# Patient Record
Sex: FEMALE | Race: BLACK OR AFRICAN AMERICAN | Employment: UNEMPLOYED | URBAN - METROPOLITAN AREA
[De-identification: names, ages, dates, MRNs, and addresses within clinical notes are randomized per-mention and may not be internally consistent; named-entity substitution may affect disease eponyms.]

---

## 2020-01-06 ENCOUNTER — HOSPITAL ENCOUNTER (EMERGENCY)
Age: 25
Discharge: HOME | End: 2020-01-06

## 2020-01-06 ENCOUNTER — APPOINTMENT (OUTPATIENT)
Dept: GENERAL RADIOLOGY | Age: 25
End: 2020-01-06

## 2020-01-06 VITALS
BODY MASS INDEX: 33.75 KG/M2 | HEIGHT: 66 IN | SYSTOLIC BLOOD PRESSURE: 143 MMHG | WEIGHT: 210 LBS | HEART RATE: 92 BPM | OXYGEN SATURATION: 98 % | RESPIRATION RATE: 20 BRPM | TEMPERATURE: 98.7 F | DIASTOLIC BLOOD PRESSURE: 72 MMHG

## 2020-01-06 LAB
HCG, URINE, POC: NEGATIVE
INFLUENZA A BY PCR: NOT DETECTED
INFLUENZA B BY PCR: DETECTED
Lab: NORMAL
NEGATIVE QC PASS/FAIL: NORMAL
POSITIVE QC PASS/FAIL: NORMAL

## 2020-01-06 PROCEDURE — 99283 EMERGENCY DEPT VISIT LOW MDM: CPT

## 2020-01-06 PROCEDURE — 87502 INFLUENZA DNA AMP PROBE: CPT

## 2020-01-06 PROCEDURE — 71046 X-RAY EXAM CHEST 2 VIEWS: CPT

## 2020-01-06 PROCEDURE — 6370000000 HC RX 637 (ALT 250 FOR IP): Performed by: NURSE PRACTITIONER

## 2020-01-06 RX ORDER — ONDANSETRON 4 MG/1
4 TABLET, ORALLY DISINTEGRATING ORAL EVERY 8 HOURS PRN
Qty: 12 TABLET | Refills: 0 | Status: SHIPPED | OUTPATIENT
Start: 2020-01-06 | End: 2020-07-13

## 2020-01-06 RX ORDER — ONDANSETRON 4 MG/1
4 TABLET, ORALLY DISINTEGRATING ORAL ONCE
Status: COMPLETED | OUTPATIENT
Start: 2020-01-06 | End: 2020-01-06

## 2020-01-06 RX ORDER — GUAIFENESIN/DEXTROMETHORPHAN 100-10MG/5
5 SYRUP ORAL 3 TIMES DAILY PRN
Qty: 120 ML | Refills: 0 | Status: SHIPPED | OUTPATIENT
Start: 2020-01-06 | End: 2020-01-16

## 2020-01-06 RX ORDER — IBUPROFEN 800 MG/1
800 TABLET ORAL ONCE
Status: COMPLETED | OUTPATIENT
Start: 2020-01-06 | End: 2020-01-06

## 2020-01-06 RX ORDER — FLUTICASONE PROPIONATE 50 MCG
1 SPRAY, SUSPENSION (ML) NASAL DAILY
Qty: 1 BOTTLE | Refills: 0 | Status: SHIPPED | OUTPATIENT
Start: 2020-01-06 | End: 2021-02-19

## 2020-01-06 RX ORDER — IBUPROFEN 800 MG/1
800 TABLET ORAL EVERY 8 HOURS PRN
Qty: 30 TABLET | Refills: 0 | Status: SHIPPED | OUTPATIENT
Start: 2020-01-06 | End: 2021-02-19

## 2020-01-06 RX ADMIN — ONDANSETRON 4 MG: 4 TABLET, ORALLY DISINTEGRATING ORAL at 09:07

## 2020-01-06 RX ADMIN — IBUPROFEN 800 MG: 800 TABLET, FILM COATED ORAL at 09:08

## 2020-01-06 ASSESSMENT — PAIN SCALES - GENERAL: PAINLEVEL_OUTOF10: 8

## 2020-01-06 NOTE — ED PROVIDER NOTES
[facility]  HPI: Sarbjit Weems is a 30 y.o. female with a past medical history of  has a past medical history of Asthma, Brachial neuritis or radiculitis NOS, Headache(784.0), Migraine, and Migraine with aura, without mention of intractable migraine without mention of status migrainosus. presenting with complaints of a cough with nasal congestion. The patient states that these symptoms began gradually. The history is obtained from the patient. The patient states that he has had some subjective chills at home. Patient does complain of a mild cough associated with it that is productive with green sputum, as well as post tussive emesis. Patient denies excessive fatigue or sleeping greater than 18 hours a day. Patient denies exposure to mononucleosis. The patient denies any abdominal pain, left upper quadrant fullness, or early satiety. The patient also denies difficulty breathing, hemoptysis, neck pain/stiffness, or blurry vision. Sx have persisted and are mildly worse which is what prompted the visit today. + exposure to sick contacts.        Review of Systems:   Pertinent positives and negatives are stated within HPI, all other systems reviewed and are negative.          --------------------------------------------- PAST HISTORY ---------------------------------------------  Past Medical History:  has a past medical history of Asthma, Brachial neuritis or radiculitis NOS, Headache(784.0), Migraine, and Migraine with aura, without mention of intractable migraine without mention of status migrainosus. Past Surgical History:  has no past surgical history on file. Social History:  reports that she has quit smoking. Her smoking use included cigarettes. She smoked 0.50 packs per day. She does not have any smokeless tobacco history on file. She reports that she does not drink alcohol or use drugs.     Family History: family history includes Arthritis in her mother; Asthma in her mother; High Blood Pressure in her mother. The patients home medications have been reviewed. Allergies: Patient has no known allergies. ------------------------- NURSING NOTES AND VITALS REVIEWED ---------------------------   The nursing notes within the ED encounter and vital signs as below have been reviewed by myself. BP (!) 143/72   Pulse 92   Temp 98.7 °F (37.1 °C) (Oral)   Resp 20   Ht 5' 6\" (1.676 m)   Wt 210 lb (95.3 kg)   LMP 12/16/2019   SpO2 98%   BMI 33.89 kg/m²   Oxygen Saturation Interpretation: Normal    The patients available past medical records and past encounters were reviewed. Physical exam:  Constitutional: Vital signs are reviewed the patient is comfortable. The patient is alert and oriented and conversant. Head: The head is atraumatic and normocephalic. Eyes: No discharge is present from the eyes. The sclera are normal.  ENT: The oropharynx demonstrates a small amount of erythema bilaterally. There is no tonsillar enlargement nor is there any exudate present. No uvular deviation or edema. No tonsillary asymmetry.  Floor of the mouth soft, no trismus, handling secretions. TMs bilaterally demonstrate no evidence of infection. Bilateral nares mucosal edema with purulent drainage. Neck: Normal range of motion is achieved in the neck. There is no JVD present. No meningeal signs are present   Anterior cervical adenopathy is absent. Respiratory/chest: The chest is nontender. Breath sounds are normal. There is no respiratory distress.   Cardiovascular: Heart shows a regular rate and rhythm without murmurs clicks or gallops. Abdominal exam: The abdomen is non tender without evidence of peritoneal signs.  Specific attention to the left upper quadrant with palpation of the spleen demonstrates no organomegaly or tenderness  Skin: warm and dry, without rash  Neurologic: GCS 15   Psych: Normal Affect  -------------------------------------------------- RESULTS condition is good            Sly Figueredo, ROLAND - NILE  01/06/20 9439

## 2020-07-13 ENCOUNTER — APPOINTMENT (OUTPATIENT)
Dept: CT IMAGING | Age: 25
End: 2020-07-13
Payer: MEDICAID

## 2020-07-13 ENCOUNTER — HOSPITAL ENCOUNTER (EMERGENCY)
Age: 25
Discharge: HOME | End: 2020-07-13
Attending: EMERGENCY MEDICINE
Payer: MEDICAID

## 2020-07-13 VITALS
HEIGHT: 67 IN | SYSTOLIC BLOOD PRESSURE: 166 MMHG | WEIGHT: 228 LBS | BODY MASS INDEX: 35.79 KG/M2 | TEMPERATURE: 96.9 F | HEART RATE: 87 BPM | OXYGEN SATURATION: 98 % | RESPIRATION RATE: 14 BRPM | DIASTOLIC BLOOD PRESSURE: 95 MMHG

## 2020-07-13 LAB
ALBUMIN SERPL-MCNC: 4.3 G/DL (ref 3.5–5.2)
ALP BLD-CCNC: 54 U/L (ref 35–104)
ALT SERPL-CCNC: 14 U/L (ref 0–32)
ANION GAP SERPL CALCULATED.3IONS-SCNC: 9 MMOL/L (ref 7–16)
AST SERPL-CCNC: 14 U/L (ref 0–31)
BASOPHILS ABSOLUTE: 0.02 E9/L (ref 0–0.2)
BASOPHILS RELATIVE PERCENT: 0.3 % (ref 0–2)
BILIRUB SERPL-MCNC: 0.4 MG/DL (ref 0–1.2)
BILIRUBIN DIRECT: <0.2 MG/DL (ref 0–0.3)
BILIRUBIN URINE: NEGATIVE
BILIRUBIN, INDIRECT: NORMAL MG/DL (ref 0–1)
BLOOD, URINE: NEGATIVE
BUN BLDV-MCNC: 18 MG/DL (ref 6–20)
CALCIUM SERPL-MCNC: 9.3 MG/DL (ref 8.6–10.2)
CHLORIDE BLD-SCNC: 107 MMOL/L (ref 98–107)
CLARITY: CLEAR
CO2: 24 MMOL/L (ref 22–29)
COLOR: YELLOW
CREAT SERPL-MCNC: 0.8 MG/DL (ref 0.5–1)
EOSINOPHILS ABSOLUTE: 0.1 E9/L (ref 0.05–0.5)
EOSINOPHILS RELATIVE PERCENT: 1.4 % (ref 0–6)
GFR AFRICAN AMERICAN: >60
GFR NON-AFRICAN AMERICAN: >60 ML/MIN/1.73
GLUCOSE BLD-MCNC: 118 MG/DL (ref 74–99)
GLUCOSE URINE: NEGATIVE MG/DL
HCG, URINE, POC: NEGATIVE
HCT VFR BLD CALC: 38.9 % (ref 34–48)
HEMOGLOBIN: 12.2 G/DL (ref 11.5–15.5)
IMMATURE GRANULOCYTES #: 0.05 E9/L
IMMATURE GRANULOCYTES %: 0.7 % (ref 0–5)
KETONES, URINE: NEGATIVE MG/DL
LACTIC ACID: 0.8 MMOL/L (ref 0.5–2.2)
LEUKOCYTE ESTERASE, URINE: NEGATIVE
LIPASE: 25 U/L (ref 13–60)
LYMPHOCYTES ABSOLUTE: 1.82 E9/L (ref 1.5–4)
LYMPHOCYTES RELATIVE PERCENT: 25.7 % (ref 20–42)
Lab: NORMAL
MCH RBC QN AUTO: 28.6 PG (ref 26–35)
MCHC RBC AUTO-ENTMCNC: 31.4 % (ref 32–34.5)
MCV RBC AUTO: 91.1 FL (ref 80–99.9)
MONOCYTES ABSOLUTE: 0.45 E9/L (ref 0.1–0.95)
MONOCYTES RELATIVE PERCENT: 6.4 % (ref 2–12)
NEGATIVE QC PASS/FAIL: NORMAL
NEUTROPHILS ABSOLUTE: 4.63 E9/L (ref 1.8–7.3)
NEUTROPHILS RELATIVE PERCENT: 65.5 % (ref 43–80)
NITRITE, URINE: NEGATIVE
PDW BLD-RTO: 13.2 FL (ref 11.5–15)
PH UA: 7 (ref 5–9)
PLATELET # BLD: 175 E9/L (ref 130–450)
PMV BLD AUTO: 11.8 FL (ref 7–12)
POSITIVE QC PASS/FAIL: NORMAL
POTASSIUM REFLEX MAGNESIUM: 4.5 MMOL/L (ref 3.5–5)
PROTEIN UA: NEGATIVE MG/DL
RBC # BLD: 4.27 E12/L (ref 3.5–5.5)
SODIUM BLD-SCNC: 140 MMOL/L (ref 132–146)
SPECIFIC GRAVITY UA: 1.02 (ref 1–1.03)
TOTAL PROTEIN: 6.9 G/DL (ref 6.4–8.3)
UROBILINOGEN, URINE: 0.2 E.U./DL
WBC # BLD: 7.1 E9/L (ref 4.5–11.5)

## 2020-07-13 PROCEDURE — 6360000002 HC RX W HCPCS: Performed by: EMERGENCY MEDICINE

## 2020-07-13 PROCEDURE — 83690 ASSAY OF LIPASE: CPT

## 2020-07-13 PROCEDURE — 2580000003 HC RX 258: Performed by: RADIOLOGY

## 2020-07-13 PROCEDURE — 74177 CT ABD & PELVIS W/CONTRAST: CPT

## 2020-07-13 PROCEDURE — 2580000003 HC RX 258: Performed by: EMERGENCY MEDICINE

## 2020-07-13 PROCEDURE — 80048 BASIC METABOLIC PNL TOTAL CA: CPT

## 2020-07-13 PROCEDURE — 96374 THER/PROPH/DIAG INJ IV PUSH: CPT

## 2020-07-13 PROCEDURE — 81003 URINALYSIS AUTO W/O SCOPE: CPT

## 2020-07-13 PROCEDURE — 99284 EMERGENCY DEPT VISIT MOD MDM: CPT

## 2020-07-13 PROCEDURE — 6360000004 HC RX CONTRAST MEDICATION: Performed by: RADIOLOGY

## 2020-07-13 PROCEDURE — 85025 COMPLETE CBC W/AUTO DIFF WBC: CPT

## 2020-07-13 PROCEDURE — 83605 ASSAY OF LACTIC ACID: CPT

## 2020-07-13 PROCEDURE — 80076 HEPATIC FUNCTION PANEL: CPT

## 2020-07-13 RX ORDER — SODIUM CHLORIDE 0.9 % (FLUSH) 0.9 %
10 SYRINGE (ML) INJECTION ONCE
Status: COMPLETED | OUTPATIENT
Start: 2020-07-13 | End: 2020-07-13

## 2020-07-13 RX ORDER — 0.9 % SODIUM CHLORIDE 0.9 %
1000 INTRAVENOUS SOLUTION INTRAVENOUS ONCE
Status: COMPLETED | OUTPATIENT
Start: 2020-07-13 | End: 2020-07-13

## 2020-07-13 RX ORDER — ONDANSETRON 4 MG/1
4 TABLET, ORALLY DISINTEGRATING ORAL EVERY 8 HOURS PRN
Qty: 21 TABLET | Refills: 0 | Status: SHIPPED | OUTPATIENT
Start: 2020-07-13 | End: 2020-07-20

## 2020-07-13 RX ORDER — ONDANSETRON 2 MG/ML
4 INJECTION INTRAMUSCULAR; INTRAVENOUS ONCE
Status: COMPLETED | OUTPATIENT
Start: 2020-07-13 | End: 2020-07-13

## 2020-07-13 RX ADMIN — IOPAMIDOL 110 ML: 755 INJECTION, SOLUTION INTRAVENOUS at 09:48

## 2020-07-13 RX ADMIN — Medication 10 ML: at 09:48

## 2020-07-13 RX ADMIN — SODIUM CHLORIDE 1000 ML: 9 INJECTION, SOLUTION INTRAVENOUS at 08:47

## 2020-07-13 RX ADMIN — ONDANSETRON 4 MG: 2 INJECTION INTRAMUSCULAR; INTRAVENOUS at 12:20

## 2020-07-13 ASSESSMENT — PAIN SCALES - GENERAL: PAINLEVEL_OUTOF10: 7

## 2020-07-13 ASSESSMENT — PAIN DESCRIPTION - LOCATION: LOCATION: ABDOMEN

## 2020-07-14 ASSESSMENT — ENCOUNTER SYMPTOMS
NAUSEA: 1
EYE REDNESS: 0
CONSTIPATION: 0
DIARRHEA: 1
BACK PAIN: 0
EYE PAIN: 0
COUGH: 0
RHINORRHEA: 0
SHORTNESS OF BREATH: 0
SORE THROAT: 0
VOMITING: 1
ABDOMINAL PAIN: 1
BLOOD IN STOOL: 0
SINUS PRESSURE: 0
ABDOMINAL DISTENTION: 0
WHEEZING: 0
EYE DISCHARGE: 0

## 2020-07-14 NOTE — ED PROVIDER NOTES
HPI  This is a 31 yo F with a PMHx significant for migraines and childhood asthma who presents with six days of generalized abdominal pain, nausea, vomiting and diarrhea. The patient states that her symptoms began abruptly and have been ongoing since. She describes the pain as an overall intermittent cramping sensation. She rates the pain a s a 7/10 and states that it does not radiate anywhere. She states she has had many episodes of NBNB emesis and diarrhea at every BM since onset of symptomsl. The patient denies recent trauma, fever, chills, fatigue, HA, dizziness, vision changes, congestion, rhinorrhea, sore throat, neck pain, chest pain, palpitations, hx of MI, LE edema, SOB, cough, wheezing, constipation, hematemesis, hematochezia, melena, dysuria, hematuria, generalized weakness and paresthesias. The patient is currently taking no blood thinners. The patient is a former smoker who denies all alcohol and illicit drug use. The history is provided by the patient. Review of Systems   Constitutional: Positive for appetite change (decreased oral intake due to symptoms). Negative for chills and fever. HENT: Negative for congestion, ear pain, postnasal drip, rhinorrhea, sinus pressure and sore throat. Eyes: Negative for pain, discharge and redness. Respiratory: Negative for cough, shortness of breath and wheezing. Cardiovascular: Negative for chest pain, palpitations and leg swelling. Gastrointestinal: Positive for abdominal pain, diarrhea, nausea and vomiting. Negative for abdominal distention, blood in stool and constipation. Genitourinary: Negative for difficulty urinating, dysuria, flank pain, frequency and hematuria. Musculoskeletal: Negative for arthralgias, back pain, myalgias and neck pain. Skin: Negative for rash and wound. Neurological: Negative for dizziness, seizures, weakness, light-headedness, numbness and headaches. Hematological: Negative for adenopathy.    All other systems reviewed and are negative. Physical Exam  Vitals signs and nursing note reviewed. Constitutional:       General: She is awake. She is in acute distress (2/2 to abdominal pain and nausea). Appearance: She is not diaphoretic. HENT:      Head: Normocephalic and atraumatic. Right Ear: External ear normal.      Left Ear: External ear normal.      Nose: Nose normal. No congestion or rhinorrhea. Mouth/Throat:      Mouth: Mucous membranes are dry. Pharynx: Oropharynx is clear. No oropharyngeal exudate or posterior oropharyngeal erythema. Eyes:      General: No scleral icterus. Right eye: No discharge. Left eye: No discharge. Extraocular Movements: Extraocular movements intact. Conjunctiva/sclera: Conjunctivae normal.      Pupils: Pupils are equal, round, and reactive to light. Neck:      Musculoskeletal: Normal range of motion and neck supple. No neck rigidity or muscular tenderness. Cardiovascular:      Rate and Rhythm: Normal rate and regular rhythm. Heart sounds: Normal heart sounds. No murmur. No friction rub. No gallop. Comments: Upper extremity and lower extremity distal pulses intact bilaterally +2/4  Pulmonary:      Effort: Pulmonary effort is normal. No respiratory distress. Breath sounds: Normal breath sounds. No wheezing, rhonchi or rales. Chest:      Chest wall: No tenderness. Abdominal:      General: Bowel sounds are normal. There is no distension. Palpations: Abdomen is soft. There is no mass. Tenderness: There is abdominal tenderness (generalized to mild palpation). There is no right CVA tenderness, left CVA tenderness, guarding or rebound. Hernia: No hernia is present. Musculoskeletal: Normal range of motion. General: No tenderness or deformity. Right lower leg: No edema. Left lower leg: No edema. Lymphadenopathy:      Cervical: No cervical adenopathy.    Skin:     General: Skin is warm and dry. Capillary Refill: Capillary refill takes less than 2 seconds. Findings: No erythema or rash. Neurological:      General: No focal deficit present. Mental Status: She is alert and oriented to person, place, and time. Sensory: No sensory deficit. Motor: No weakness. Coordination: Coordination normal.        Procedures:  No procedures performed. ---------------------------------- PAST HISTORY ---------------------------------------------  Past Medical History:  has a past medical history of Asthma, Brachial neuritis or radiculitis NOS, Headache(784.0), Migraine, and Migraine with aura, without mention of intractable migraine without mention of status migrainosus. Past Surgical History:  has no past surgical history on file. Social History:  reports that she has quit smoking. Her smoking use included cigarettes. She smoked 0.50 packs per day. She does not have any smokeless tobacco history on file. She reports that she does not drink alcohol or use drugs. Family History: family history includes Arthritis in her mother; Asthma in her mother; High Blood Pressure in her mother. The patients home medications have been reviewed. Allergies: Patient has no known allergies.     -------------------------------------------------- RESULTS -------------------------------------------------  Labs:  Results for orders placed or performed during the hospital encounter of 07/13/20   CBC Auto Differential   Result Value Ref Range    WBC 7.1 4.5 - 11.5 E9/L    RBC 4.27 3.50 - 5.50 E12/L    Hemoglobin 12.2 11.5 - 15.5 g/dL    Hematocrit 38.9 34.0 - 48.0 %    MCV 91.1 80.0 - 99.9 fL    MCH 28.6 26.0 - 35.0 pg    MCHC 31.4 (L) 32.0 - 34.5 %    RDW 13.2 11.5 - 15.0 fL    Platelets 027 297 - 656 E9/L    MPV 11.8 7.0 - 12.0 fL    Neutrophils % 65.5 43.0 - 80.0 %    Immature Granulocytes % 0.7 0.0 - 5.0 %    Lymphocytes % 25.7 20.0 - 42.0 %    Monocytes % 6.4 2.0 - 12.0 % Eosinophils % 1.4 0.0 - 6.0 %    Basophils % 0.3 0.0 - 2.0 %    Neutrophils Absolute 4.63 1.80 - 7.30 E9/L    Immature Granulocytes # 0.05 E9/L    Lymphocytes Absolute 1.82 1.50 - 4.00 E9/L    Monocytes Absolute 0.45 0.10 - 0.95 E9/L    Eosinophils Absolute 0.10 0.05 - 0.50 E9/L    Basophils Absolute 0.02 0.00 - 0.20 A3/P   Basic Metabolic Panel w/ Reflex to MG   Result Value Ref Range    Sodium 140 132 - 146 mmol/L    Potassium reflex Magnesium 4.5 3.5 - 5.0 mmol/L    Chloride 107 98 - 107 mmol/L    CO2 24 22 - 29 mmol/L    Anion Gap 9 7 - 16 mmol/L    Glucose 118 (H) 74 - 99 mg/dL    BUN 18 6 - 20 mg/dL    CREATININE 0.8 0.5 - 1.0 mg/dL    GFR Non-African American >60 >=60 mL/min/1.73    GFR African American >60     Calcium 9.3 8.6 - 10.2 mg/dL   Hepatic Function Panel   Result Value Ref Range    Total Protein 6.9 6.4 - 8.3 g/dL    Alb 4.3 3.5 - 5.2 g/dL    Alkaline Phosphatase 54 35 - 104 U/L    ALT 14 0 - 32 U/L    AST 14 0 - 31 U/L    Total Bilirubin 0.4 0.0 - 1.2 mg/dL    Bilirubin, Direct <0.2 0.0 - 0.3 mg/dL    Bilirubin, Indirect see below 0.0 - 1.0 mg/dL   Lipase   Result Value Ref Range    Lipase 25 13 - 60 U/L   Urinalysis, reflex to microscopic   Result Value Ref Range    Color, UA Yellow Straw/Yellow    Clarity, UA Clear Clear    Glucose, Ur Negative Negative mg/dL    Bilirubin Urine Negative Negative    Ketones, Urine Negative Negative mg/dL    Specific Gravity, UA 1.020 1.005 - 1.030    Blood, Urine Negative Negative    pH, UA 7.0 5.0 - 9.0    Protein, UA Negative Negative mg/dL    Urobilinogen, Urine 0.2 <2.0 E.U./dL    Nitrite, Urine Negative Negative    Leukocyte Esterase, Urine Negative Negative   Lactic Acid, Plasma   Result Value Ref Range    Lactic Acid 0.8 0.5 - 2.2 mmol/L   POC Pregnancy Urine Qual   Result Value Ref Range    HCG, Urine, POC Negative Negative    Lot Number [id number]     Positive QC Pass/Fail Pass     Negative QC Pass/Fail Pass          Radiology:  CT ABDOMEN PELVIS W IV primary care physician in 2-3 days. The patient verbalized her understanding and agreement with the plan as presented and stated her intention to follow up with her PCP by calling to schedule an appointment as soon as possible. Reasons to return to the ER or seek immediate evaluation by a medical provider were discussed at length and all questions were answered. The patient was discharged home in stable condition. MDM:  Patient presented from home with several days of abrupt onset generalized abdominal tenderness and n/v/d. Exam as above. Urine preg neg. Labs wnl. Patient given zofran and IVF with symptomatic improvement. CT abd/pel was unremarkable. Patient discharged with prescription for zofran and recommendation for symptomatic care and PCP follow up. Stable at discharge. Discharge Medication List as of 7/13/2020 12:49 PM          Diagnosis:  1. Non-intractable vomiting with nausea, unspecified vomiting type    2. Diarrhea, unspecified type    3. Generalized abdominal pain        Disposition:  Patient's disposition: Discharge to home  Patient's condition is stable.        Lea Jacobo,   Resident  07/14/20 1095

## 2020-09-09 ENCOUNTER — HOSPITAL ENCOUNTER (EMERGENCY)
Age: 25
Discharge: LEFT BEFORE DISCHARGE | End: 2020-09-09
Attending: EMERGENCY MEDICINE
Payer: MEDICAID

## 2020-09-09 VITALS
SYSTOLIC BLOOD PRESSURE: 134 MMHG | HEIGHT: 66 IN | HEART RATE: 80 BPM | RESPIRATION RATE: 16 BRPM | WEIGHT: 228 LBS | DIASTOLIC BLOOD PRESSURE: 77 MMHG | OXYGEN SATURATION: 98 % | BODY MASS INDEX: 36.64 KG/M2 | TEMPERATURE: 97.6 F

## 2020-09-09 LAB
ALBUMIN SERPL-MCNC: 4.3 G/DL (ref 3.5–5.2)
ALP BLD-CCNC: 57 U/L (ref 35–104)
ALT SERPL-CCNC: 13 U/L (ref 0–32)
ANION GAP SERPL CALCULATED.3IONS-SCNC: 11 MMOL/L (ref 7–16)
AST SERPL-CCNC: 13 U/L (ref 0–31)
BACTERIA: ABNORMAL /HPF
BASOPHILS ABSOLUTE: 0.02 E9/L (ref 0–0.2)
BASOPHILS RELATIVE PERCENT: 0.2 % (ref 0–2)
BILIRUB SERPL-MCNC: 0.6 MG/DL (ref 0–1.2)
BILIRUBIN URINE: NEGATIVE
BLOOD, URINE: NEGATIVE
BUN BLDV-MCNC: 11 MG/DL (ref 6–20)
CALCIUM SERPL-MCNC: 9.6 MG/DL (ref 8.6–10.2)
CHLORIDE BLD-SCNC: 103 MMOL/L (ref 98–107)
CLARITY: ABNORMAL
CO2: 25 MMOL/L (ref 22–29)
COLOR: YELLOW
CREAT SERPL-MCNC: 0.7 MG/DL (ref 0.5–1)
EOSINOPHILS ABSOLUTE: 0.08 E9/L (ref 0.05–0.5)
EOSINOPHILS RELATIVE PERCENT: 0.8 % (ref 0–6)
EPITHELIAL CELLS, UA: ABNORMAL /HPF
GFR AFRICAN AMERICAN: >60
GFR NON-AFRICAN AMERICAN: >60 ML/MIN/1.73
GLUCOSE BLD-MCNC: 85 MG/DL (ref 74–99)
GLUCOSE URINE: NEGATIVE MG/DL
HCG, URINE, POC: NEGATIVE
HCT VFR BLD CALC: 38 % (ref 34–48)
HEMOGLOBIN: 11.7 G/DL (ref 11.5–15.5)
IMMATURE GRANULOCYTES #: 0.04 E9/L
IMMATURE GRANULOCYTES %: 0.4 % (ref 0–5)
KETONES, URINE: NEGATIVE MG/DL
LACTIC ACID, SEPSIS: 0.6 MMOL/L (ref 0.5–1.9)
LEUKOCYTE ESTERASE, URINE: NEGATIVE
LYMPHOCYTES ABSOLUTE: 2.59 E9/L (ref 1.5–4)
LYMPHOCYTES RELATIVE PERCENT: 27.3 % (ref 20–42)
Lab: NORMAL
MCH RBC QN AUTO: 28.1 PG (ref 26–35)
MCHC RBC AUTO-ENTMCNC: 30.8 % (ref 32–34.5)
MCV RBC AUTO: 91.3 FL (ref 80–99.9)
MONOCYTES ABSOLUTE: 0.63 E9/L (ref 0.1–0.95)
MONOCYTES RELATIVE PERCENT: 6.7 % (ref 2–12)
NEGATIVE QC PASS/FAIL: NORMAL
NEUTROPHILS ABSOLUTE: 6.11 E9/L (ref 1.8–7.3)
NEUTROPHILS RELATIVE PERCENT: 64.6 % (ref 43–80)
NITRITE, URINE: NEGATIVE
PDW BLD-RTO: 13.3 FL (ref 11.5–15)
PH UA: 7 (ref 5–9)
PLATELET # BLD: 182 E9/L (ref 130–450)
PMV BLD AUTO: 11.3 FL (ref 7–12)
POSITIVE QC PASS/FAIL: NORMAL
POTASSIUM SERPL-SCNC: 4.1 MMOL/L (ref 3.5–5)
PROTEIN UA: NEGATIVE MG/DL
RBC # BLD: 4.16 E12/L (ref 3.5–5.5)
RBC UA: ABNORMAL /HPF (ref 0–2)
SODIUM BLD-SCNC: 139 MMOL/L (ref 132–146)
SPECIFIC GRAVITY UA: 1.02 (ref 1–1.03)
TOTAL PROTEIN: 7.3 G/DL (ref 6.4–8.3)
UROBILINOGEN, URINE: 0.2 E.U./DL
WBC # BLD: 9.5 E9/L (ref 4.5–11.5)
WBC UA: ABNORMAL /HPF (ref 0–5)

## 2020-09-09 PROCEDURE — 99282 EMERGENCY DEPT VISIT SF MDM: CPT

## 2020-09-09 PROCEDURE — 81001 URINALYSIS AUTO W/SCOPE: CPT

## 2020-09-09 PROCEDURE — 99283 EMERGENCY DEPT VISIT LOW MDM: CPT

## 2020-09-09 PROCEDURE — 85025 COMPLETE CBC W/AUTO DIFF WBC: CPT

## 2020-09-09 PROCEDURE — 83605 ASSAY OF LACTIC ACID: CPT

## 2020-09-09 PROCEDURE — 80053 COMPREHEN METABOLIC PANEL: CPT

## 2020-09-09 NOTE — ED PROVIDER NOTES
HPI:  9/9/20, Time: 6:00 PM EDT         Deepak Dey is a 30 y.o. female presenting to the ED for right breast pain beginning a few days ago. Patient denies any trauma to her breast.  She was seen in urgent care yesterday, and she was started on Keflex 500 mg twice daily. She states she has been taking it without relief. She states that her symptoms have been worsening. She states that she previously had a nipple ring in place which she removed. She states that pus drained from both holes from the nipple ring initially, but that is now resolved. She states that she feels a lump in her breast which is worse with palpation. No nipple discharge. She denies similar episodes in the past.  Patient is not breast-feeding. No fevers, nausea, vomiting, diarrhea, chest pain, shortness of breath, or abdominal pain. Review of Systems:   Pertinent positives and negatives are stated within HPI, all other systems reviewed and are negative.          --------------------------------------------- PAST HISTORY ---------------------------------------------  Past Medical History:  has a past medical history of Asthma, Brachial neuritis or radiculitis NOS, Headache(784.0), Migraine, and Migraine with aura, without mention of intractable migraine without mention of status migrainosus. Past Surgical History:  has no past surgical history on file. Social History:  reports that she has quit smoking. Her smoking use included cigarettes. She smoked 0.50 packs per day. She has never used smokeless tobacco. She reports that she does not drink alcohol or use drugs. Family History: family history includes Arthritis in her mother; Asthma in her mother; High Blood Pressure in her mother. The patients home medications have been reviewed. Allergies: Patient has no known allergies.     -------------------------------------------------- RESULTS -------------------------------------------------  All laboratory and radiology <2.0 E.U./dL    Nitrite, Urine Negative Negative    Leukocyte Esterase, Urine Negative Negative    WBC, UA NONE 0 - 5 /HPF    RBC, UA NONE 0 - 2 /HPF    Epithelial Cells, UA MANY /HPF    Bacteria, UA FEW (A) None Seen /HPF   Lactate, Sepsis   Result Value Ref Range    Lactic Acid, Sepsis 0.6 0.5 - 1.9 mmol/L   POC Pregnancy Urine Qual   Result Value Ref Range    HCG, Urine, POC Negative Negative    Lot Number [id number]     Positive QC Pass/Fail Acceptable     Negative QC Pass/Fail Acceptable        RADIOLOGY:  Interpreted by Radiologist.  Aminta Overton NONVASCULAR TRUNK SCAN    (Results Pending)       ------------------------- NURSING NOTES AND VITALS REVIEWED ---------------------------   The nursing notes within the ED encounter and vital signs as below have been reviewed. /77   Pulse 80   Temp 97.6 °F (36.4 °C)   Resp 16   Ht 5' 6\" (1.676 m)   Wt 228 lb (103.4 kg)   SpO2 98%   BMI 36.80 kg/m²   Oxygen Saturation Interpretation: Normal      ---------------------------------------------------PHYSICAL EXAM--------------------------------------      Constitutional/General: Alert and oriented x3, well appearing, non toxic in NAD  Head: Normocephalic and atraumatic  Eyes: PERRL, EOMI  Mouth: Oropharynx clear, handling secretions, no trismus  Neck: Supple, full ROM,   Pulmonary: Lungs clear to auscultation bilaterally, no wheezes, rales, or rhonchi. Not in respiratory distress  Cardiovascular:  Regular rate and rhythm, no murmurs, gallops, or rubs. 2+ distal pulses  Abdomen: Soft, non tender, non distended,   Breast: Right breast-area of induration posterior and superior to the areola with mild overlying erythema, no active drainage, no nipple drainage  Extremities: Moves all extremities x 4.  Warm and well perfused  Skin: warm and dry without rash  Neurologic: GCS 15, no focal motor or sensory deficits   Psych: Normal Affect      ------------------------------ ED COURSE/MEDICAL DECISION MAKING----------------------  Medications - No data to display    Medical Decision Making/ED COURSE:   Patient is a 49-year-old female presenting with breast lump and pain. In the ED, patient was hemodynamically stable and afebrile. On exam, had a palpable lump to her right breast with mild overlying erythema and no active discharge. Labs and breast ultrasound obtained. I reviewed and interpreted labs. No acute findings. Patient eloped from the ED prior to completion of breast ultrasound. Patient remained hemodynamically stable throughout ED course. ED Course as of Sep 10 0302   Wed Sep 09, 2020   8171 Patient reportedly eloped. [JA]      ED Course User Index  [JA] Harika Mckenzie MD       Counseling: The emergency provider has spoken with the patient and discussed todays results, in addition to providing specific details for the plan of care and counseling regarding the diagnosis and prognosis. Questions are answered at this time and they are agreeable with the plan.      --------------------------------- IMPRESSION AND DISPOSITION ---------------------------------    IMPRESSION  1. Breast mass        DISPOSITION  Disposition: eloped  Patient condition is stable      NOTE: This report was transcribed using voice recognition software.  Every effort was made to ensure accuracy; however, inadvertent computerized transcription errors may be present    IHarika MD, am the primary provider of this record       Harika Mckenzie MD  09/10/20 4771

## 2020-09-10 ENCOUNTER — APPOINTMENT (OUTPATIENT)
Dept: ULTRASOUND IMAGING | Age: 25
End: 2020-09-10
Payer: MEDICAID

## 2020-09-10 ENCOUNTER — HOSPITAL ENCOUNTER (EMERGENCY)
Age: 25
Discharge: HOME | End: 2020-09-10
Payer: MEDICAID

## 2020-09-10 VITALS
HEART RATE: 71 BPM | BODY MASS INDEX: 35.79 KG/M2 | TEMPERATURE: 97.5 F | DIASTOLIC BLOOD PRESSURE: 109 MMHG | SYSTOLIC BLOOD PRESSURE: 177 MMHG | WEIGHT: 228 LBS | OXYGEN SATURATION: 97 % | HEIGHT: 67 IN | RESPIRATION RATE: 16 BRPM

## 2020-09-10 PROCEDURE — 99282 EMERGENCY DEPT VISIT SF MDM: CPT

## 2020-09-10 PROCEDURE — 76999 ECHO EXAMINATION PROCEDURE: CPT

## 2020-09-10 PROCEDURE — 99283 EMERGENCY DEPT VISIT LOW MDM: CPT

## 2020-09-10 RX ORDER — SULFAMETHOXAZOLE AND TRIMETHOPRIM 800; 160 MG/1; MG/1
1 TABLET ORAL 2 TIMES DAILY
Qty: 20 TABLET | Refills: 0 | Status: SHIPPED | OUTPATIENT
Start: 2020-09-10 | End: 2020-09-20

## 2020-09-10 RX ORDER — HYDROCODONE BITARTRATE AND ACETAMINOPHEN 5; 325 MG/1; MG/1
1 TABLET ORAL EVERY 6 HOURS PRN
Qty: 12 TABLET | Refills: 0 | Status: SHIPPED | OUTPATIENT
Start: 2020-09-10 | End: 2020-09-13

## 2020-09-10 ASSESSMENT — PAIN SCALES - GENERAL: PAINLEVEL_OUTOF10: 9

## 2020-09-10 NOTE — ED PROVIDER NOTES
[facility]       Department of Emergency Medicine   ED  Provider Note  Admit Date/RoomTime: 9/10/2020  9:07 AM  ED Room: ANITA/ANITA  MRN: [id number]  Chief Complaint: Breast Pain (pain in right breast which started last month and worsened last week )       History of Present Illness   Source of history provided by:  patient. History/Exam Limitations: none. Fernando Clemons is a 30 y.o. female who has a past medical history of:   Past Medical History:   Diagnosis Date    Asthma     Brachial neuritis or radiculitis NOS     Headache(784.0)     Migraine     Migraine with aura, without mention of intractable migraine without mention of status migrainosus     presents to the ED by private car and is alone for right breast pain. Patient states that has been uncomfortable for the past month. She states her last several days she has had increasing pain to the breast.  She states that 2 days ago she was seen at urgent care. She states that she was started on Keflex as there was some overlying erythema to the area. She states that she has had her nipples pierced for 5 years, states that she took the nipple rings out about a week ago. She states that there is some yellow drainage from the area. She states that her nipple drained yellow discharge yesterday as well. She was seen and evaluated in ER yesterday. She states that she was unable to wait for her ultrasound and that so she presents today. She denies any fevers or chills. Nothing make her symptoms better or worse. ROS    Pertinent positives and negatives are stated within HPI, all other systems reviewed and are negative. No past surgical history on file. Social History:  reports that she has quit smoking. Her smoking use included cigarettes. She smoked 0.50 packs per day. She has never used smokeless tobacco. She reports that she does not drink alcohol or use drugs.   Family History: family history includes Arthritis in her mother; Asthma in her abscess versus neoplasm             ED Course / Medical Decision Making   Medications - No data to display     Re-examination:  9/10/20       Time: 4609   Updated on results. Consult(s):   None    Procedure(s):   none    MDM:   Patient had labs done yesterday, these were reviewed. White blood cell count within normal range, patient was not pregnant. Ultrasound shows mass versus abscess. Nothing superficial to I&D at this time, there is minimal overlying erythema. No fevers at home, vital signs are stable. Patient appears well. Will discharge home at this time. Will add Bactrim as she is already taking Keflex. Patient is advised that she will need to follow-up with a breast surgeon and is again given this information with Dr. Julian Piedra. Advised return the ER for any worsening symptoms. Otherwise to follow-up as directed    Counseling: The emergency provider has spoken with the patient and discussed todays results, in addition to providing specific details for the plan of care and counseling regarding the diagnosis and prognosis. Questions are answered at this time and they are agreeable with the plan. Assessment      1. Breast mass in female      Plan   Discharge to home  Patient condition is good    New Medications     Discharge Medication List as of 9/10/2020  1:08 PM      START taking these medications    Details   sulfamethoxazole-trimethoprim (BACTRIM DS) 800-160 MG per tablet Take 1 tablet by mouth 2 times daily for 10 days, Disp-20 tablet,R-0Print      HYDROcodone-acetaminophen (NORCO) 5-325 MG per tablet Take 1 tablet by mouth every 6 hours as needed for Pain for up to 3 days. , Disp-12 tablet,R-0Print           Electronically signed by GARETH Avelar   DD: 9/10/20  **This report was transcribed using voice recognition software. Every effort was made to ensure accuracy; however, inadvertent computerized transcription errors may be present.   END OF ED PROVIDER NOTE       Bry Kemp Shaneka Sargent, [place]  09/10/20 1408

## 2020-09-20 ENCOUNTER — HOSPITAL ENCOUNTER (EMERGENCY)
Age: 25
Discharge: HOME | End: 2020-09-20
Payer: MEDICAID

## 2020-09-20 VITALS
HEIGHT: 66 IN | OXYGEN SATURATION: 99 % | RESPIRATION RATE: 19 BRPM | BODY MASS INDEX: 35.36 KG/M2 | WEIGHT: 220 LBS | TEMPERATURE: 97.6 F | SYSTOLIC BLOOD PRESSURE: 125 MMHG | DIASTOLIC BLOOD PRESSURE: 80 MMHG | HEART RATE: 80 BPM

## 2020-09-20 PROCEDURE — 6370000000 HC RX 637 (ALT 250 FOR IP): Performed by: PHYSICIAN ASSISTANT

## 2020-09-20 PROCEDURE — 99284 EMERGENCY DEPT VISIT MOD MDM: CPT

## 2020-09-20 PROCEDURE — 99283 EMERGENCY DEPT VISIT LOW MDM: CPT

## 2020-09-20 RX ORDER — OXYCODONE HYDROCHLORIDE AND ACETAMINOPHEN 5; 325 MG/1; MG/1
1 TABLET ORAL EVERY 6 HOURS PRN
Qty: 10 TABLET | Refills: 0 | Status: SHIPPED | OUTPATIENT
Start: 2020-09-20 | End: 2020-09-25

## 2020-09-20 RX ORDER — CLINDAMYCIN HYDROCHLORIDE 150 MG/1
300 CAPSULE ORAL 3 TIMES DAILY
Qty: 60 CAPSULE | Refills: 0 | Status: SHIPPED | OUTPATIENT
Start: 2020-09-20 | End: 2020-09-30

## 2020-09-20 RX ORDER — OXYCODONE HYDROCHLORIDE AND ACETAMINOPHEN 5; 325 MG/1; MG/1
1 TABLET ORAL ONCE
Status: COMPLETED | OUTPATIENT
Start: 2020-09-20 | End: 2020-09-20

## 2020-09-20 RX ORDER — CLINDAMYCIN HYDROCHLORIDE 150 MG/1
300 CAPSULE ORAL ONCE
Status: COMPLETED | OUTPATIENT
Start: 2020-09-20 | End: 2020-09-20

## 2020-09-20 RX ADMIN — CLINDAMYCIN HYDROCHLORIDE 300 MG: 150 CAPSULE ORAL at 23:45

## 2020-09-20 RX ADMIN — OXYCODONE AND ACETAMINOPHEN 1 TABLET: 5; 325 TABLET ORAL at 23:12

## 2020-09-20 ASSESSMENT — PAIN SCALES - GENERAL
PAINLEVEL_OUTOF10: 10
PAINLEVEL_OUTOF10: 10

## 2020-09-21 NOTE — ED NOTES
Bed: 21  Expected date:   Expected time:   Means of arrival:   Comments:  triage     Ghassan Wang RN  09/20/20 6968

## 2020-09-24 ENCOUNTER — HOSPITAL ENCOUNTER (OUTPATIENT)
Age: 25
Discharge: HOME | End: 2020-09-26
Payer: MEDICAID

## 2020-09-24 PROCEDURE — 87186 SC STD MICRODIL/AGAR DIL: CPT

## 2020-09-24 PROCEDURE — 87205 SMEAR GRAM STAIN: CPT

## 2020-09-24 PROCEDURE — 87070 CULTURE OTHR SPECIMN AEROBIC: CPT

## 2020-09-24 PROCEDURE — 87077 CULTURE AEROBIC IDENTIFY: CPT

## 2020-09-25 ENCOUNTER — HOSPITAL ENCOUNTER (EMERGENCY)
Age: 25
Discharge: LEFT BEFORE DISCHARGE | End: 2020-09-25
Payer: MEDICAID

## 2020-09-25 VITALS
BODY MASS INDEX: 35.79 KG/M2 | OXYGEN SATURATION: 97 % | WEIGHT: 228 LBS | DIASTOLIC BLOOD PRESSURE: 85 MMHG | SYSTOLIC BLOOD PRESSURE: 164 MMHG | TEMPERATURE: 97.2 F | HEIGHT: 67 IN | HEART RATE: 90 BPM | RESPIRATION RATE: 18 BRPM

## 2020-09-25 LAB — GRAM STAIN ORDERABLE: NORMAL

## 2020-09-25 PROCEDURE — 6370000000 HC RX 637 (ALT 250 FOR IP): Performed by: NURSE PRACTITIONER

## 2020-09-25 PROCEDURE — 99283 EMERGENCY DEPT VISIT LOW MDM: CPT

## 2020-09-25 RX ORDER — ONDANSETRON 4 MG/1
4 TABLET, ORALLY DISINTEGRATING ORAL ONCE
Status: COMPLETED | OUTPATIENT
Start: 2020-09-25 | End: 2020-09-25

## 2020-09-25 RX ORDER — IBUPROFEN 800 MG/1
800 TABLET ORAL ONCE
Status: COMPLETED | OUTPATIENT
Start: 2020-09-25 | End: 2020-09-25

## 2020-09-25 RX ADMIN — IBUPROFEN 800 MG: 800 TABLET ORAL at 10:28

## 2020-09-25 RX ADMIN — ONDANSETRON 4 MG: 4 TABLET, ORALLY DISINTEGRATING ORAL at 10:28

## 2020-09-25 ASSESSMENT — PAIN SCALES - GENERAL
PAINLEVEL_OUTOF10: 6
PAINLEVEL_OUTOF10: 8

## 2020-09-25 ASSESSMENT — PAIN DESCRIPTION - LOCATION: LOCATION: BREAST

## 2020-09-25 ASSESSMENT — PAIN DESCRIPTION - DESCRIPTORS: DESCRIPTORS: BURNING

## 2020-09-25 ASSESSMENT — PAIN DESCRIPTION - ORIENTATION: ORIENTATION: RIGHT

## 2020-09-25 ASSESSMENT — PAIN DESCRIPTION - FREQUENCY: FREQUENCY: INTERMITTENT

## 2020-09-25 NOTE — ED NOTES
This rn went to preform wound care. Pt not in room. Bathrooms of ready care checked. Lobby checked.  Pt could not be found         Asyassa Curling, RN  09/25/20 9395

## 2020-09-25 NOTE — ED NOTES
Pt did not come back to room bathrooms checked again and lobby checked. This pt could not be found.  np made aware       Tyron Vizcarra RN  09/25/20 1637

## 2020-09-25 NOTE — CARE COORDINATION
Social Work [address] [place] [address] Planning:    Pt presents to the ED secondary to breast pain. Pt was at Dr Jared Morales office yesterday and had an abscess drained. Pt states she is unable to change dressing due to pain/nausea but has a friend Yudelka Escalante, who is able to learn if needed. SW spoke to staff from Dr Jared Morales office and inquired if [place] [address] was set up and/or what dressing change orders should be. SW to wait for return call. 1130am- Per Enma Esparza at Dr Jared Morales office, pt will need daily dressing changes and he is willing to follow if [place] [address] order is written for pt. Pt does not have a preference of [place] [address] agency. Several referrals made to [place] [address] agencies and pt has been denied. Per NILE Jones, pt has eloped from ED.

## 2020-09-25 NOTE — ED PROVIDER NOTES
[facility]     Department of Emergency Medicine   ED  Provider Note  Admit Date/RoomTime: 9/25/2020 10:09 AM  ED Room: 31/31   Chief Complaint   Breast Pain (had abscess rt breast drained yesterday; unable do dressing change due to nausea/ pain)    History of Present Illness   Source of history provided by:  patient. History/Exam Limitations: none. Susi Aparicio is a 30 y.o. old female who has a past medical history of:   Past Medical History:   Diagnosis Date    Asthma     Brachial neuritis or radiculitis NOS     Headache(784.0)     Migraine     Migraine with aura, without mention of intractable migraine without mention of status migrainosus     presents to the emergency department by private vehicle, for evaluation of I&D site located on right breast, which occured 1 day(s) prior to arrival. She reports she has packing and a dressing on which she is to change daily but she cannot do it, it makes her nauseated and almost pass out and no family can help her. She denies any new/worsening pain, fever or chills. She is taking norco for pain and clindamycin. Prior Treatment:     []   Sutured      []   Stapled      []   Packing      [x]     ATB's: clindamycin     ROS    Pertinent positives and negatives are stated within HPI, all other systems reviewed and are negative. No past surgical history on file. Social History:  reports that she has been smoking cigarettes. She has been smoking about 0.50 packs per day. She has never used smokeless tobacco. She reports that she does not drink alcohol or use drugs. Family History: family history includes Arthritis in her mother; Asthma in her mother; High Blood Pressure in her mother. Allergies: Patient has no known allergies.     Physical Exam           ED Triage Vitals [09/25/20 1006]   BP Temp Temp Source Pulse Resp SpO2 Height Weight   (!) 164/85 97.2 °F (36.2 °C) Tympanic 90 18 97 % 5' 6.5\" (1.689 m) 228 lb (103.4 kg)      Oxygen Saturation Interpretation: Normal.    Constitutional:  Alert, development consistent with age. HEENT:  NC/NT. Airway patent. Neck:  Normal ROM. Supple. Respiratory:  Clear to auscultation and breath sounds equal.  CV:  Regular rate and rhythm, normal heart sounds, without pathological murmurs, ectopy, gallops, or rubs. GI:  Abdomen Soft, nontender, good bowel sounds. No firm or pulsatile mass. Back:  No costovertebral tenderness. Extremities: No tenderness or edema noted. Integument: incision to 12 o'clock right breast just superior to areola. Packing intact, when removed slight purulent drainage, mild direct surrounding erythema. Normal turgor. Warm, dry, without visible rash, unless noted elsewhere. Lymphatics: No lymphangitis or adenopathy noted. Neurological:  Oriented. Motor functions intact. Lab / Imaging Results   (All laboratory and radiology results have been personally reviewed by myself)  Labs:  No results found for this visit on 09/25/20. Imaging: All Radiology results interpreted by Radiologist unless otherwise noted. No orders to display     ED Course / Medical Decision Making     Medications   ondansetron (ZOFRAN-ODT) disintegrating tablet 4 mg (has no administration in time range)   ibuprofen (ADVIL;MOTRIN) tablet 800 mg (has no administration in time range)          Consult(s):   IP CONSULT TO SOCIAL WORK    Procedure(s):       MDM:   Wound to right breast cleaned, repacked and redressed. Patient came to the emergency department today because she is unable to change her dressings at home. Social work consulted and is attempting to arrange home care as per surgery once daily dressing changes and she is unable to do this, however went into recheck patient and she was walking out stating \"I have to  my children\". [facility] care orders placed in [facility] and Dr. Dhruv Fields office aware of home care  needs. Counseling:     The emergency provider has spoken with the patient and discussed todays results, in addition to providing specific details for the plan of care and counseling regarding the diagnosis and prognosis. Questions are answered at this time and they are agreeable with the plan. Assessment     1. Encounter for surgical wound dressing change      Plan   Disposition: Discharge to home  Patient condition is good    New Medications     New Prescriptions    No medications on file     Electronically signed by ROLAND Colon CNP   DD: 9/25/20  **This report was transcribed using voice recognition software. Every effort was made to ensure accuracy; however, inadvertent computerized transcription errors may be present.   END OF ED PROVIDER NOTE      ROLAND Clayton CNP  09/25/20 [id number]

## 2020-09-28 LAB
GRAM STAIN RESULT: ABNORMAL
ORGANISM: ABNORMAL
WOUND/ABSCESS: ABNORMAL

## 2020-10-26 ENCOUNTER — HOSPITAL ENCOUNTER (EMERGENCY)
Age: 25
Discharge: HOME | End: 2020-10-26
Attending: EMERGENCY MEDICINE
Payer: MEDICAID

## 2020-10-26 VITALS
SYSTOLIC BLOOD PRESSURE: 135 MMHG | TEMPERATURE: 98.1 F | WEIGHT: 228 LBS | HEIGHT: 66 IN | DIASTOLIC BLOOD PRESSURE: 70 MMHG | BODY MASS INDEX: 36.64 KG/M2 | RESPIRATION RATE: 14 BRPM | OXYGEN SATURATION: 98 % | HEART RATE: 68 BPM

## 2020-10-26 LAB
ANION GAP SERPL CALCULATED.3IONS-SCNC: 8 MMOL/L (ref 7–16)
BASOPHILS ABSOLUTE: 0.02 E9/L (ref 0–0.2)
BASOPHILS RELATIVE PERCENT: 0.3 % (ref 0–2)
BUN BLDV-MCNC: 13 MG/DL (ref 6–20)
CALCIUM SERPL-MCNC: 9.2 MG/DL (ref 8.6–10.2)
CHLORIDE BLD-SCNC: 106 MMOL/L (ref 98–107)
CO2: 25 MMOL/L (ref 22–29)
CREAT SERPL-MCNC: 0.8 MG/DL (ref 0.5–1)
EOSINOPHILS ABSOLUTE: 0.11 E9/L (ref 0.05–0.5)
EOSINOPHILS RELATIVE PERCENT: 1.5 % (ref 0–6)
GFR AFRICAN AMERICAN: >60
GFR NON-AFRICAN AMERICAN: >60 ML/MIN/1.73
GLUCOSE BLD-MCNC: 112 MG/DL (ref 74–99)
HCG QUALITATIVE: NEGATIVE
HCT VFR BLD CALC: 38.9 % (ref 34–48)
HEMOGLOBIN: 12 G/DL (ref 11.5–15.5)
IMMATURE GRANULOCYTES #: 0.02 E9/L
IMMATURE GRANULOCYTES %: 0.3 % (ref 0–5)
LYMPHOCYTES ABSOLUTE: 1.98 E9/L (ref 1.5–4)
LYMPHOCYTES RELATIVE PERCENT: 27.8 % (ref 20–42)
MCH RBC QN AUTO: 28.3 PG (ref 26–35)
MCHC RBC AUTO-ENTMCNC: 30.8 % (ref 32–34.5)
MCV RBC AUTO: 91.7 FL (ref 80–99.9)
MONOCYTES ABSOLUTE: 0.5 E9/L (ref 0.1–0.95)
MONOCYTES RELATIVE PERCENT: 7 % (ref 2–12)
NEUTROPHILS ABSOLUTE: 4.48 E9/L (ref 1.8–7.3)
NEUTROPHILS RELATIVE PERCENT: 63.1 % (ref 43–80)
PDW BLD-RTO: 13.4 FL (ref 11.5–15)
PLATELET # BLD: 201 E9/L (ref 130–450)
PMV BLD AUTO: 11.4 FL (ref 7–12)
POTASSIUM REFLEX MAGNESIUM: 4.1 MMOL/L (ref 3.5–5)
RBC # BLD: 4.24 E12/L (ref 3.5–5.5)
SODIUM BLD-SCNC: 139 MMOL/L (ref 132–146)
WBC # BLD: 7.1 E9/L (ref 4.5–11.5)

## 2020-10-26 PROCEDURE — 84703 CHORIONIC GONADOTROPIN ASSAY: CPT

## 2020-10-26 PROCEDURE — 85025 COMPLETE CBC W/AUTO DIFF WBC: CPT

## 2020-10-26 PROCEDURE — 99282 EMERGENCY DEPT VISIT SF MDM: CPT

## 2020-10-26 PROCEDURE — 80048 BASIC METABOLIC PNL TOTAL CA: CPT

## 2020-10-26 RX ORDER — SULFAMETHOXAZOLE AND TRIMETHOPRIM 800; 160 MG/1; MG/1
2 TABLET ORAL 2 TIMES DAILY
Qty: 28 TABLET | Refills: 0 | Status: SHIPPED | OUTPATIENT
Start: 2020-10-26 | End: 2020-11-02

## 2020-10-26 RX ORDER — LIDOCAINE HYDROCHLORIDE AND EPINEPHRINE 10; 10 MG/ML; UG/ML
20 INJECTION, SOLUTION INFILTRATION; PERINEURAL ONCE
Status: DISCONTINUED | OUTPATIENT
Start: 2020-10-26 | End: 2020-10-26 | Stop reason: HOSPADM

## 2020-10-26 RX ORDER — SULFAMETHOXAZOLE AND TRIMETHOPRIM 800; 160 MG/1; MG/1
1 TABLET ORAL 2 TIMES DAILY
Qty: 14 TABLET | Refills: 0 | OUTPATIENT
Start: 2020-10-26 | End: 2020-11-02

## 2020-10-26 RX ORDER — CEPHALEXIN 500 MG/1
500 CAPSULE ORAL 2 TIMES DAILY
Qty: 14 CAPSULE | Refills: 0 | Status: SHIPPED | OUTPATIENT
Start: 2020-10-26 | End: 2020-11-02

## 2020-10-26 ASSESSMENT — ENCOUNTER SYMPTOMS
SORE THROAT: 0
SINUS PRESSURE: 0
BACK PAIN: 0
SHORTNESS OF BREATH: 0
ABDOMINAL DISTENTION: 0
WHEEZING: 0
COUGH: 0
EYE PAIN: 0
VOMITING: 0
EYE REDNESS: 0
EYE DISCHARGE: 0
DIARRHEA: 0
NAUSEA: 0

## 2020-10-26 ASSESSMENT — PAIN SCALES - GENERAL: PAINLEVEL_OUTOF10: 4

## 2020-10-26 NOTE — ED NOTES
Pt to the ed with a c/o right breast pain and tenderness states has been seen and treated several times for this since September first and pain is worse and increase drainage. No other complaints at this time.       Steff Concepcion RN  10/26/20 [id number]

## 2020-10-26 NOTE — ED NOTES
Requested unit clerk to page Dr. Loretta Davies for follow up on bedside procedure.      Félix Mace RN  10/26/20 [id number]

## 2020-10-26 NOTE — ED PROVIDER NOTES
Patient presented with purulent drainage from her nipple and pain for masses. It is not currently draining. Patient states that she had 2 cyst and her right breast that were previously drained. Yesterday however she started to have drainage from it and expressed a large amount of pus. She states that she used to have nipple piercings and thinks that that may be the original cause of all this. The sister located at 12:00 and 9:00 to the right nipple. Patient states that she does have an appointment with Dr. Jarad Tejeda tomorrow to follow-up regarding this issue. He was the one that originally drained it. Patient is not complaining of any fevers, chills, chest pain, shortness of breath, nausea, vomiting, or malaise. The history is provided by the patient. No  was used. Abscess   Location:  Torso  Torso abscess location:  R breast  Abscess quality: draining and painful    Abscess quality: no fluctuance    Red streaking: no    Duration:  1 month  Progression:  Partially resolved  Associated symptoms: no fever, no headaches, no nausea and no vomiting         Review of Systems   Constitutional: Negative for chills and fever. HENT: Negative for ear pain, sinus pressure and sore throat. Eyes: Negative for pain, discharge and redness. Respiratory: Negative for cough, shortness of breath and wheezing. Cardiovascular: Negative for chest pain. Gastrointestinal: Negative for abdominal distention, diarrhea, nausea and vomiting. Genitourinary: Negative for dysuria and frequency. Musculoskeletal: Negative for arthralgias and back pain. Skin: Negative for rash and wound. 2 abscess of the right breast.   Neurological: Negative for weakness and headaches. Hematological: Negative for adenopathy. All other systems reviewed and are negative. Physical Exam  Vitals signs and nursing note reviewed. Constitutional:       Appearance: She is well-developed.    HENT: Head: Normocephalic and atraumatic. Eyes:      Conjunctiva/sclera: Conjunctivae normal.   Neck:      Musculoskeletal: Normal range of motion and neck supple. Cardiovascular:      Rate and Rhythm: Normal rate and regular rhythm. Pulses: Normal pulses. Heart sounds: Normal heart sounds. No murmur. Pulmonary:      Effort: Pulmonary effort is normal. No respiratory distress. Breath sounds: Normal breath sounds. No wheezing or rales. Abdominal:      General: Bowel sounds are normal.      Palpations: Abdomen is soft. Tenderness: There is no abdominal tenderness. There is no guarding or rebound. Skin:     General: Skin is warm and dry. Comments: Induration of the right breast at 12:00 and 9:00 of the nipple. Neurological:      Mental Status: She is alert and oriented to person, place, and time. Cranial Nerves: No cranial nerve deficit. Coordination: Coordination normal.          Procedures     MDM  Number of Diagnoses or Management Options  Abscess:   Diagnosis management comments: Patient's vitals were stable during her entire visit. There does not appear to be any systemic symptoms. Patient is a Dr. Isidra Sánchez patient consulted him. He advised to have the surgery resident look at it. Surgery resident looked at it and reported that there was nothing to drain. Patient will be discharged with Keflex and Bactrim and was advised to keep her follow-up appointment with Dr. Isidra Sánchez. Patient informed of results and plan and is agreeable. Patient to be discharged. Amount and/or Complexity of Data Reviewed  Clinical lab tests: reviewed         ED Course as of Oct 26 1555   Mon Oct 26, 2020   1220 Awaiting surgical recommendations. [JA]      ED Course User Index  [JA] Conard Oppenheim, MD        ED Course as of Oct 26 1555   Mon Oct 26, 2020   1220 Awaiting surgical recommendations.      [JA]      ED Course User Index  [JA] Conard Oppenheim, MD mmol/L    Potassium reflex Magnesium 4.1 3.5 - 5.0 mmol/L    Chloride 106 98 - 107 mmol/L    CO2 25 22 - 29 mmol/L    Anion Gap 8 7 - 16 mmol/L    Glucose 112 (H) 74 - 99 mg/dL    BUN 13 6 - 20 mg/dL    CREATININE 0.8 0.5 - 1.0 mg/dL    GFR Non-African American >60 >=60 mL/min/1.73    GFR African American >60     Calcium 9.2 8.6 - 10.2 mg/dL   HCG Qualitative, Serum   Result Value Ref Range    hCG Qual NEGATIVE NEGATIVE       Radiology:  No orders to display       ------------------------- NURSING NOTES AND VITALS REVIEWED ---------------------------  Date / Time Roomed:  10/26/2020  8:19 AM  ED Bed Assignment:  ANITA/ANITA    The nursing notes within the ED encounter and vital signs as below have been reviewed. /70   Pulse 68   Temp 98.1 °F (36.7 °C) (Oral)   Resp 14   Ht 5' 6\" (1.676 m)   Wt 228 lb (103.4 kg)   SpO2 98%   BMI 36.80 kg/m²   Oxygen Saturation Interpretation: Normal      ------------------------------------------ PROGRESS NOTES ------------------------------------------  3:54 PM EDT  I have spoken with the patient and discussed todays results, in addition to providing specific details for the plan of care and counseling regarding the diagnosis and prognosis. Their questions are answered at this time and they are agreeable with the plan. I discussed at length with them reasons for immediate return here for re evaluation. They will followup with their general surgeon by calling their office tomorrow. --------------------------------- ADDITIONAL PROVIDER NOTES ---------------------------------  At this time the patient is without objective evidence of an acute process requiring hospitalization or inpatient management. They have remained hemodynamically stable throughout their entire ED visit and are stable for discharge with outpatient follow-up.      The plan has been discussed in detail and they are aware of the specific conditions for emergent return, as well as the importance of follow-up. Discharge Medication List as of 10/26/2020  3:22 PM      START taking these medications    Details   sulfamethoxazole-trimethoprim (BACTRIM DS;SEPTRA DS) 800-160 MG per tablet Take 2 tablets by mouth 2 times daily for 7 days, Disp-28 tablet,R-0Print      cephALEXin (KEFLEX) 500 MG capsule Take 1 capsule by mouth 2 times daily for 7 days, Disp-14 capsule,R-0Print             Diagnosis:  1. Abscess        Disposition:  Patient's disposition: Discharge to home  Patient's condition is stable. Patient was seen and evaluated by both myself and No att. providers found.        Stefano Magallanes DO  Resident  10/26/20 5580

## 2020-10-26 NOTE — CONSULTS
GENERAL SURGERY  CONSULT NOTE  10/26/2020    Physician Consulted: Dr. Latoya Day  Reason for Consult: breast abscess    DEMARCUS Ware is a 30 y.o. female who presents for evaluation of recurrent breast abscess. She recently had a breast abscess and was seen in the clinic by Dr. Remedios Kaminski. At that time it was tapped. She experienced relief at that time. For a few days, the patient complains of increasing pain in her right breast.  She has some associated erythema. The pain is mostly around her nipple. She has noticed yellowish discharge from her nipple for several days. She is not currently breast-feeding. She has no personal or family history of breast or ovarian/endometrial cancer. She denies fevers. On exam she has normocardiac and slightly hypertensive. She denies taking antibiotics recently. She does not use chemical contraception. Past Medical History:   Diagnosis Date    Asthma     Brachial neuritis or radiculitis NOS     Headache(784.0)     Migraine     Migraine with aura, without mention of intractable migraine without mention of status migrainosus        No past surgical history on file. Medications Prior to Admission:    Prior to Admission medications    Medication Sig Start Date End Date Taking? Authorizing Provider   ibuprofen (IBU) 800 MG tablet Take 1 tablet by mouth every 8 hours as needed for Pain or Fever Take with food. 1/6/20   ROLAND Fitzpatrick CNP   fluticasone Brittanyallan Randhawa) 50 MCG/ACT nasal spray 1 spray by Each Nostril route daily 1/6/20   ROLAND Haji CNP   Prenatal Vit-Fe Fumarate-FA (PRENAPLUS) 27-1 MG TABS Take 1 tablet by mouth daily. 6/14/13   Maria A Rascon MD   prenatal vitamin (PRENATAL-S) 27-0.8 MG TABS Take 1 tablet by mouth daily. Historical Provider, MD   albuterol (PROVENTIL) (2.5 MG/3ML) 0.083% nebulizer solution Take 2.5 mg by nebulization as needed.       Historical Provider, MD   Albuterol Sulfate (PROAIR HFA IN) Inhale 2 Inhalers into the lungs every 4 hours as needed. Historical Provider, MD       No Known Allergies    Family History   Problem Relation Age of Onset    High Blood Pressure Mother     Asthma Mother     Arthritis Mother        Social History     Tobacco Use    Smoking status: Current Every Day Smoker     Packs/day: 0.50     Types: Cigarettes    Smokeless tobacco: Never Used   Substance Use Topics    Alcohol use: No    Drug use: No         Review of Systems   General ROS: negative  Hematological and Lymphatic ROS: negative  Respiratory ROS: no cough, shortness of breath, or wheezing  Cardiovascular ROS: negative  Gastrointestinal ROS: no abdominal pain, change in bowel habits, or black or bloody stools  Genito-Urinary ROS: no dysuria, trouble voiding, or hematuria  Musculoskeletal ROS: negative      PHYSICAL EXAM:    Vitals:    10/26/20 0857   BP: (!) 163/90   Pulse: 78   Resp: 16   Temp: 98 °F (36.7 °C)   SpO2: 99%       General Appearance:  awake, alert, oriented, in no acute distress  Skin:  Some blanchable erythema at right breast  Head/face:  NCAT  Eyes:  No gross abnormalities. Lungs:  Symmetric chest rise, no audible wheezes  Heart:  Warm and well perfused throughout  Breasts: symmetric, TTP at right breast, unable to express discharge with palpation of the right breast, no obvious masses felt bilaterally  Abdomen:  Soft, non-tender, non-distended  Extremities: Extremities warm to touch, pink, with no edema. LABS:    CBC  Recent Labs     10/26/20  0913   WBC 7.1   HGB 12.0   HCT 38.9        BMP  Recent Labs     10/26/20  0913      K 4.1      CO2 25   BUN 13   CREATININE 0.8   CALCIUM 9.2     Liver Function  No results for input(s): AMYLASE, LIPASE, BILITOT, BILIDIR, AST, ALT, ALKPHOS, PROT, LABALBU in the last 72 hours. No results for input(s): LACTATE in the last 72 hours. No results for input(s): INR, PTT in the last 72 hours.     Invalid input(s): PT    RADIOLOGY  No imaging obtained      ASSESSMENT:  30 y.o. female with right breast discomfort and nipple discharge. Likely recurrent breast abscess. Bedside ultrasound performed and no drainable fluid collections were identified. She has a follow up appointment with Dr. Yoly Higuera tomorrow.  Plan for the following:      PLAN:  - recommend bactrim for 14 days  - warm compresses to affected breast  - close f/u with Dr. Yoly Higuera - already scheduled  - discharge from ED    Electronically signed by Tree Villegas MD on 10/26/20 at 11:50 AM EDT

## 2020-12-29 NOTE — ED NOTES
Pt c/o increased pain/shooting pain in right breast. Denies needing pain medication. Warm compress provided. Pt awaiting consult.      Chester Galan RN  10/26/20 0790 [Mother] : mother

## 2021-02-19 ENCOUNTER — OFFICE VISIT (OUTPATIENT)
Dept: FAMILY MEDICINE CLINIC | Age: 26
End: 2021-02-19
Payer: MEDICAID

## 2021-02-19 VITALS
BODY MASS INDEX: 36.54 KG/M2 | OXYGEN SATURATION: 98 % | RESPIRATION RATE: 16 BRPM | DIASTOLIC BLOOD PRESSURE: 82 MMHG | HEIGHT: 67 IN | TEMPERATURE: 96.8 F | HEART RATE: 81 BPM | SYSTOLIC BLOOD PRESSURE: 118 MMHG | WEIGHT: 232.8 LBS

## 2021-02-19 DIAGNOSIS — F41.9 ANXIETY: ICD-10-CM

## 2021-02-19 DIAGNOSIS — Z00.00 ROUTINE HEALTH MAINTENANCE: ICD-10-CM

## 2021-02-19 DIAGNOSIS — Z00.00 ROUTINE HEALTH MAINTENANCE: Primary | ICD-10-CM

## 2021-02-19 DIAGNOSIS — Z23 FLU VACCINE NEED: ICD-10-CM

## 2021-02-19 DIAGNOSIS — Z12.4 CERVICAL CANCER SCREENING: ICD-10-CM

## 2021-02-19 DIAGNOSIS — F17.200 NICOTINE USE DISORDER: ICD-10-CM

## 2021-02-19 LAB
ALBUMIN SERPL-MCNC: 4.3 G/DL (ref 3.5–5.2)
ALP BLD-CCNC: 56 U/L (ref 35–104)
ALT SERPL-CCNC: 16 U/L (ref 0–32)
ANION GAP SERPL CALCULATED.3IONS-SCNC: 8 MMOL/L (ref 7–16)
AST SERPL-CCNC: 15 U/L (ref 0–31)
BASOPHILS ABSOLUTE: 0.02 E9/L (ref 0–0.2)
BASOPHILS RELATIVE PERCENT: 0.3 % (ref 0–2)
BILIRUB SERPL-MCNC: 0.5 MG/DL (ref 0–1.2)
BUN BLDV-MCNC: 14 MG/DL (ref 6–20)
CALCIUM SERPL-MCNC: 9.4 MG/DL (ref 8.6–10.2)
CHLORIDE BLD-SCNC: 108 MMOL/L (ref 98–107)
CHOLESTEROL, TOTAL: 109 MG/DL (ref 0–199)
CO2: 26 MMOL/L (ref 22–29)
CREAT SERPL-MCNC: 0.7 MG/DL (ref 0.5–1)
EOSINOPHILS ABSOLUTE: 0.08 E9/L (ref 0.05–0.5)
EOSINOPHILS RELATIVE PERCENT: 1.1 % (ref 0–6)
GFR AFRICAN AMERICAN: >60
GFR NON-AFRICAN AMERICAN: >60 ML/MIN/1.73
GLUCOSE BLD-MCNC: 98 MG/DL (ref 74–99)
HBA1C MFR BLD: 4.7 % (ref 4–5.6)
HCT VFR BLD CALC: 40.4 % (ref 34–48)
HDLC SERPL-MCNC: 50 MG/DL
HEMOGLOBIN: 12.7 G/DL (ref 11.5–15.5)
IMMATURE GRANULOCYTES #: 0.03 E9/L
IMMATURE GRANULOCYTES %: 0.4 % (ref 0–5)
LDL CHOLESTEROL CALCULATED: 52 MG/DL (ref 0–99)
LYMPHOCYTES ABSOLUTE: 1.96 E9/L (ref 1.5–4)
LYMPHOCYTES RELATIVE PERCENT: 26.7 % (ref 20–42)
MCH RBC QN AUTO: 28.9 PG (ref 26–35)
MCHC RBC AUTO-ENTMCNC: 31.4 % (ref 32–34.5)
MCV RBC AUTO: 92 FL (ref 80–99.9)
MONOCYTES ABSOLUTE: 0.42 E9/L (ref 0.1–0.95)
MONOCYTES RELATIVE PERCENT: 5.7 % (ref 2–12)
NEUTROPHILS ABSOLUTE: 4.84 E9/L (ref 1.8–7.3)
NEUTROPHILS RELATIVE PERCENT: 65.8 % (ref 43–80)
PDW BLD-RTO: 13.3 FL (ref 11.5–15)
PLATELET # BLD: 201 E9/L (ref 130–450)
PMV BLD AUTO: 11.9 FL (ref 7–12)
POTASSIUM SERPL-SCNC: 4.5 MMOL/L (ref 3.5–5)
RBC # BLD: 4.39 E12/L (ref 3.5–5.5)
SODIUM BLD-SCNC: 142 MMOL/L (ref 132–146)
TOTAL PROTEIN: 7.2 G/DL (ref 6.4–8.3)
TRIGL SERPL-MCNC: 36 MG/DL (ref 0–149)
TSH SERPL DL<=0.05 MIU/L-ACNC: 1.48 UIU/ML (ref 0.27–4.2)
VLDLC SERPL CALC-MCNC: 7 MG/DL
WBC # BLD: 7.4 E9/L (ref 4.5–11.5)

## 2021-02-19 PROCEDURE — 90471 IMMUNIZATION ADMIN: CPT | Performed by: STUDENT IN AN ORGANIZED HEALTH CARE EDUCATION/TRAINING PROGRAM

## 2021-02-19 PROCEDURE — 99203 OFFICE O/P NEW LOW 30 MIN: CPT | Performed by: STUDENT IN AN ORGANIZED HEALTH CARE EDUCATION/TRAINING PROGRAM

## 2021-02-19 PROCEDURE — G8427 DOCREV CUR MEDS BY ELIG CLIN: HCPCS | Performed by: STUDENT IN AN ORGANIZED HEALTH CARE EDUCATION/TRAINING PROGRAM

## 2021-02-19 PROCEDURE — 90686 IIV4 VACC NO PRSV 0.5 ML IM: CPT | Performed by: STUDENT IN AN ORGANIZED HEALTH CARE EDUCATION/TRAINING PROGRAM

## 2021-02-19 PROCEDURE — G8417 CALC BMI ABV UP PARAM F/U: HCPCS | Performed by: STUDENT IN AN ORGANIZED HEALTH CARE EDUCATION/TRAINING PROGRAM

## 2021-02-19 PROCEDURE — 4004F PT TOBACCO SCREEN RCVD TLK: CPT | Performed by: STUDENT IN AN ORGANIZED HEALTH CARE EDUCATION/TRAINING PROGRAM

## 2021-02-19 PROCEDURE — G8482 FLU IMMUNIZE ORDER/ADMIN: HCPCS | Performed by: STUDENT IN AN ORGANIZED HEALTH CARE EDUCATION/TRAINING PROGRAM

## 2021-02-19 RX ORDER — BUSPIRONE HYDROCHLORIDE 5 MG/1
5 TABLET ORAL 2 TIMES DAILY
Qty: 60 TABLET | Refills: 0 | Status: SHIPPED | OUTPATIENT
Start: 2021-02-19 | End: 2021-03-21

## 2021-02-19 ASSESSMENT — ENCOUNTER SYMPTOMS
EYE PAIN: 0
SINUS PRESSURE: 0
ABDOMINAL PAIN: 0
SINUS PAIN: 0
BACK PAIN: 0
COUGH: 0
DIARRHEA: 0
CONSTIPATION: 0
VOMITING: 0
NAUSEA: 0
RHINORRHEA: 0
SHORTNESS OF BREATH: 0
EYE REDNESS: 0
SORE THROAT: 0

## 2021-02-19 ASSESSMENT — PATIENT HEALTH QUESTIONNAIRE - PHQ9
SUM OF ALL RESPONSES TO PHQ9 QUESTIONS 1 & 2: 3
7. TROUBLE CONCENTRATING ON THINGS, SUCH AS READING THE NEWSPAPER OR WATCHING TELEVISION: 3
SUM OF ALL RESPONSES TO PHQ QUESTIONS 1-9: 18
SUM OF ALL RESPONSES TO PHQ QUESTIONS 1-9: 18
4. FEELING TIRED OR HAVING LITTLE ENERGY: 3
5. POOR APPETITE OR OVEREATING: 1

## 2021-02-19 ASSESSMENT — COLUMBIA-SUICIDE SEVERITY RATING SCALE - C-SSRS
1. WITHIN THE PAST MONTH, HAVE YOU WISHED YOU WERE DEAD OR WISHED YOU COULD GO TO SLEEP AND NOT WAKE UP?: NO
2. HAVE YOU ACTUALLY HAD ANY THOUGHTS OF KILLING YOURSELF?: NO
6. HAVE YOU EVER DONE ANYTHING, STARTED TO DO ANYTHING, OR PREPARED TO DO ANYTHING TO END YOUR LIFE?: NO

## 2021-02-19 NOTE — PATIENT INSTRUCTIONS
Patient Education        Pneumococcal Polysaccharide Vaccine: Care Instructions  Your Care Instructions     The pneumococcal polysaccharide vaccine (PPSV) can prevent some of the serious complications of pneumonia. This includes infection in the bloodstream (bacteremia) or throughout the body (septicemia). PPSV is recommended for people ages 72 years and older. People ages 3 to 59 who have a long-term illness should also get the vaccine. This includes people with diabetes, heart disease, liver disease, or lung disease. PPSV can also help people who have a weakened immune system. This includes cancer patients and people who don't have a spleen. The immune system helps your body fight infection and other illnesses. PPSV is given as a shot. It's usually given in the arm. Healthy older adults get the shot once. Other people may need to have a second dose. The shot may cause pain and redness at the site. It may also cause a mild fever for a short time. Follow-up care is a key part of your treatment and safety. Be sure to make and go to all appointments, and call your doctor if you are having problems. It's also a good idea to know your test results and keep a list of the medicines you take. How can you care for yourself at home? · Take an over-the-counter pain medicine, such as acetaminophen (Tylenol), ibuprofen (Advil, Motrin), or naproxen (Aleve), if your arm is sore after the shot. Be safe with medicines. Read and follow all instructions on the label. · Give acetaminophen (Tylenol) or ibuprofen (Advil, Motrin) to your child for pain or fussiness after the shot. Read and follow all instructions on the label. Do not give aspirin to anyone younger than 20. It has been linked to Reye syndrome, a serious illness. · Put ice or a cold pack on the sore area for 10 to 20 minutes at a time. Put a thin cloth between the ice and your skin. When should you call for help?

## 2021-02-19 NOTE — PROGRESS NOTES
2021    Dontae Garcia (:  1995) is a 27 y.o. female, here for evaluation of the following medical concerns:    Patient is a 27year old female here today to establish care with myself. She has a history of Brachial neuritis or radiculitis, migraines, and asthma. She has a nebulizer and an inhaler in the past. She has no known allergies. Patient denies CP, SOB, nausea, vomiting, diarrhea, fevers chills sweats, abdominal pain, numbness tingling, dizziness, lightheadedness, back pain, ear pain, eye pain, nasal congestion, headaches, blurry vision. Anxiety  Patient is here for evaluation of anxiety. She has the following anxiety symptoms: dizziness, fatigue, irritable, racing thoughts. Onset of symptoms was approximately a few months ago. Symptoms have been unchanged since that time. She denies current suicidal and homicidal ideation. Family history significant for unsure. Possible organic causes contributing are: none. Risk factors: negative life event mother passed away on [employer] Previous treatment includes none. She recently lost her mother on [employer]. Her mother was in a car wreck 2020 and her boyfriend tried killing her. She had a stroke on kirk delmy. Her mothers boyfriend is doing well. She has seen him once. He tried to kill himself recently, he drinks a lot. She has 7 siblings and she is the oldest of the siblings. Chief Complaint   Patient presents with    Anxiety    Establish Care       Review of Systems   Constitutional: Negative for chills, fatigue and fever. HENT: Negative for congestion, ear pain, postnasal drip, rhinorrhea, sinus pressure, sinus pain and sore throat. Eyes: Negative for pain and redness. Respiratory: Negative for cough and shortness of breath. Cardiovascular: Negative for chest pain. Gastrointestinal: Negative for abdominal pain, constipation, diarrhea, nausea and vomiting. Genitourinary: Negative for difficulty urinating and dysuria. Musculoskeletal: Negative for back pain, myalgias and neck pain. Skin: Negative for rash. Neurological: Negative for dizziness, light-headedness and numbness. Psychiatric/Behavioral: Positive for dysphoric mood. The patient is nervous/anxious. Prior to Visit Medications    Medication Sig Taking? Authorizing Provider   sertraline (ZOLOFT) 50 MG tablet Take 1 tablet by mouth daily Yes Opal Harvey, DO   busPIRone (BUSPAR) 5 MG tablet Take 1 tablet by mouth 2 times daily Yes Opal Wes, DO        Allergies   Allergen Reactions    No Known Allergies        Past Medical History:   Diagnosis Date    Asthma     Brachial neuritis or radiculitis NOS     Headache(784.0)     Migraine     Migraine with aura, without mention of intractable migraine without mention of status migrainosus        History reviewed. No pertinent surgical history.     Social History     Socioeconomic History    Marital status: Single     Spouse name: Not on file    Number of children: Not on file    Years of education: Not on file    Highest education level: Not on file   Occupational History    Not on file   Social Needs    Financial resource strain: Not on file    Food insecurity     Worry: Not on file     Inability: Not on file    Transportation needs     Medical: Not on file     Non-medical: Not on file   Tobacco Use    Smoking status: Current Every Day Smoker     Packs/day: 0.50     Types: Cigarettes    Smokeless tobacco: Never Used   Substance and Sexual Activity    Alcohol use: No    Drug use: No    Sexual activity: Never   Lifestyle    Physical activity     Days per week: Not on file     Minutes per session: Not on file    Stress: Not on file   Relationships    Social connections     Talks on phone: Not on file     Gets together: Not on file     Attends [employer] service: Not on file     Active member of club or organization: Not on file Attends meetings of clubs or organizations: Not on file     Relationship status: Not on file    Intimate partner violence     Fear of current or ex partner: Not on file     Emotionally abused: Not on file     Physically abused: Not on file     Forced sexual activity: Not on file   Other Topics Concern    Not on file   Social History Narrative    ** Merged History Encounter **         ** Data from: 2/22/13 Enc Dept: [facility]         ** Data from: 3/24/13 Enc Dept: [facility] [place] EMERGENCY    ** Merged History Encounter **             Family History   Problem Relation Age of Onset    High Blood Pressure Mother     Asthma Mother     Arthritis Mother     Breast Cancer Paternal Grandmother            Vitals:    02/19/21 0958   BP: 118/82   Pulse: 81   Resp: 16   Temp: 96.8 °F (36 °C)   SpO2: 98%   Weight: 232 lb 12.8 oz (105.6 kg)   Height: 5' 7\" (1.702 m)     Estimated body mass index is 36.46 kg/m² as calculated from the following:    Height as of this encounter: 5' 7\" (1.702 m). Weight as of this encounter: 232 lb 12.8 oz (105.6 kg).     Most Recent Labs  CBC  Lab Results   Component Value Date    WBC 7.1 10/26/2020    WBC 9.5 09/09/2020    WBC 7.1 07/13/2020    RBC 4.24 10/26/2020    RBC 4.16 09/09/2020    RBC 4.27 07/13/2020    HGB 12.0 10/26/2020    HGB 11.7 09/09/2020    HGB 12.2 07/13/2020    HCT 38.9 10/26/2020    HCT 38.0 09/09/2020    HCT 38.9 07/13/2020    MCV 91.7 10/26/2020    MCV 91.3 09/09/2020    MCV 91.1 07/13/2020     10/26/2020     09/09/2020     07/13/2020      CMP  Lab Results   Component Value Date     10/26/2020     09/09/2020     07/13/2020    K 4.1 10/26/2020    K 4.1 09/09/2020    K 4.5 07/13/2020    K 4.1 09/12/2016    K 3.6 04/13/2015     10/26/2020     09/09/2020     07/13/2020    CO2 25 10/26/2020    CO2 25 09/09/2020    CO2 24 07/13/2020    ANIONGAP 8 10/26/2020    ANIONGAP 11 09/09/2020    ANIONGAP 9 07/13/2020 GLUCOSE 112 10/26/2020    GLUCOSE 85 09/09/2020    GLUCOSE 118 07/13/2020    GLUCOSE 86 05/05/2011    GLUCOSE 85 02/07/2011    GLUCOSE 107 10/09/2010    BUN 13 10/26/2020    BUN 11 09/09/2020    BUN 18 07/13/2020    CREATININE 0.8 10/26/2020    CREATININE 0.7 09/09/2020    CREATININE 0.8 07/13/2020    LABGLOM >60 10/26/2020    LABGLOM >60 09/09/2020    LABGLOM >60 07/13/2020    GFRAA >60 10/26/2020    GFRAA >60 09/09/2020    GFRAA >60 07/13/2020    CALCIUM 9.2 10/26/2020    CALCIUM 9.6 09/09/2020    CALCIUM 9.3 07/13/2020    PROT 7.3 09/09/2020    PROT 6.9 07/13/2020    PROT 6.3 09/12/2016    LABALBU 4.3 09/09/2020    LABALBU 4.3 07/13/2020    LABALBU 3.4 09/12/2016    LABALBU 4.4 10/09/2010    BILITOT 0.6 09/09/2020    BILITOT 0.4 07/13/2020    BILITOT 0.3 09/12/2016    ALKPHOS 57 09/09/2020    ALKPHOS 54 07/13/2020    ALKPHOS 80 09/12/2016    AST 13 09/09/2020    AST 14 07/13/2020    AST 13 09/12/2016    ALT 13 09/09/2020    ALT 14 07/13/2020    ALT 12 09/12/2016     UA  Lab Results   Component Value Date    COLORU Yellow 09/09/2020    COLORU Yellow 07/13/2020    COLORU Yellow 09/12/2016    CLARITYU SL CLOUDY 09/09/2020    CLARITYU Clear 07/13/2020    CLARITYU Clear 09/12/2016    GLUCOSEU Negative 09/09/2020    GLUCOSEU Negative 07/13/2020    GLUCOSEU Negative 09/12/2016    GLUCOSEU NEGATIVE 09/17/2011    GLUCOSEU NEGATIVE 06/18/2011    GLUCOSEU NEGATIVE 05/05/2011    BILIRUBINUR Negative 09/09/2020    BILIRUBINUR Negative 07/13/2020    BILIRUBINUR Negative 09/12/2016    BILIRUBINUR NEGATIVE 09/17/2011    BILIRUBINUR NEGATIVE 06/18/2011    BILIRUBINUR SMALL 05/05/2011    KETUA Negative 09/09/2020    KETUA Negative 07/13/2020    KETUA Negative 09/12/2016    SPECGRAV 1.020 09/09/2020    SPECGRAV 1.020 07/13/2020    SPECGRAV 1.015 09/12/2016    BLOODU Negative 09/09/2020    BLOODU Negative 07/13/2020    BLOODU Negative 09/12/2016    PHUR 7.0 09/09/2020    PHUR 7.0 07/13/2020    PHUR 7.0 09/12/2016 PROTEINU Negative 09/09/2020    PROTEINU Negative 07/13/2020    PROTEINU Negative 09/12/2016    UROBILINOGEN 0.2 09/09/2020    UROBILINOGEN 0.2 07/13/2020    UROBILINOGEN 0.2 09/12/2016    NITRU Negative 09/09/2020    NITRU Negative 07/13/2020    NITRU Negative 09/12/2016    LEUKOCYTESUR Negative 09/09/2020    LEUKOCYTESUR Negative 07/13/2020    LEUKOCYTESUR Negative 09/12/2016       Physical Exam  Vitals signs and nursing note reviewed. Constitutional:       Appearance: Normal appearance. HENT:      Head: Normocephalic and atraumatic. Right Ear: Tympanic membrane, ear canal and external ear normal.      Left Ear: Tympanic membrane, ear canal and external ear normal.      Nose: Nose normal.      Mouth/Throat:      Mouth: Mucous membranes are moist.      Pharynx: Oropharynx is clear. Eyes:      Extraocular Movements: Extraocular movements intact. Conjunctiva/sclera: Conjunctivae normal.      Pupils: Pupils are equal, round, and reactive to light. Neck:      Musculoskeletal: Normal range of motion and neck supple. Cardiovascular:      Rate and Rhythm: Normal rate and regular rhythm. Pulses: Normal pulses. Heart sounds: Normal heart sounds. Pulmonary:      Effort: Pulmonary effort is normal.      Breath sounds: Normal breath sounds. Abdominal:      General: Bowel sounds are normal.      Palpations: Abdomen is soft. Musculoskeletal: Normal range of motion. Skin:     General: Skin is warm and dry. Capillary Refill: Capillary refill takes less than 2 seconds. Neurological:      General: No focal deficit present. Mental Status: She is alert and oriented to person, place, and time. Psychiatric:         Mood and Affect: Mood is anxious. Affect is tearful. Behavior: Behavior normal.         Thought Content: Thought content normal.         ASSESSMENT/PLAN:  Tigre Marion was seen today for anxiety and establish care.     Diagnoses and all orders for this visit: Routine health maintenance  -     CBC Auto Differential; Future  -     Comprehensive Metabolic Panel; Future  -     Hemoglobin A1C; Future  -     TSH without Reflex; Future  -     Lipid Panel; Future    Flu vaccine need  -     INFLUENZA, QUADV, 3 YRS AND OLDER, IM PF, PREFILL SYR OR SDV, 0.5ML (AFLURIA QUADV, PF)    Cervical cancer screening    Anxiety  -     sertraline (ZOLOFT) 50 MG tablet; Take 1 tablet by mouth daily  -     busPIRone (BUSPAR) 5 MG tablet; Take 1 tablet by mouth 2 times daily  -     [address] [place] [address]    Nicotine use disorder    Patient will call and make an appointment for a pap smear   Patient not ready to quit smoking at this time    Educational materials and/or home exercises printed for patient's review and were included in patient instructions on his/her After Visit Summary and given to patient at the end of visit. Counseled regarding above diagnosis, including possible risks and complications,  especially if left uncontrolled. Counseled regarding the possible side effects, risks, benefits and alternatives to treatment; patient and/or guardian verbalizes understanding, agrees, feels comfortable with and wishes to proceed with above treatment plan. Advised patient to call with any new medication issues, and read all Rx info from pharmacy to assure aware of all possible risks and side effects of medication before taking. Reviewed age and gender appropriate health screening exams and vaccinations. Advised patient regarding importance of keeping up with recommended health maintenance and to schedule as soon as possible if overdue, as this is important in assessing for undiagnosed pathology, especially cancer, as well as protecting against potentially harmful/life threatening disease. Patient and/or guardian verbalizes understanding and agrees with above counseling, assessment and plan. All questions answered.

## 2021-05-01 ENCOUNTER — HOSPITAL ENCOUNTER (EMERGENCY)
Age: 26
Discharge: HOME | End: 2021-05-01
Payer: MEDICAID

## 2021-05-01 VITALS
DIASTOLIC BLOOD PRESSURE: 80 MMHG | OXYGEN SATURATION: 100 % | SYSTOLIC BLOOD PRESSURE: 126 MMHG | WEIGHT: 220 LBS | RESPIRATION RATE: 16 BRPM | HEART RATE: 80 BPM | TEMPERATURE: 98.6 F | BODY MASS INDEX: 35.36 KG/M2 | HEIGHT: 66 IN

## 2021-05-01 DIAGNOSIS — R51.9 NONINTRACTABLE HEADACHE, UNSPECIFIED CHRONICITY PATTERN, UNSPECIFIED HEADACHE TYPE: ICD-10-CM

## 2021-05-01 DIAGNOSIS — R11.2 NON-INTRACTABLE VOMITING WITH NAUSEA, UNSPECIFIED VOMITING TYPE: Primary | ICD-10-CM

## 2021-05-01 PROCEDURE — 96375 TX/PRO/DX INJ NEW DRUG ADDON: CPT

## 2021-05-01 PROCEDURE — 99284 EMERGENCY DEPT VISIT MOD MDM: CPT

## 2021-05-01 PROCEDURE — 2580000003 HC RX 258: Performed by: NURSE PRACTITIONER

## 2021-05-01 PROCEDURE — 6360000002 HC RX W HCPCS: Performed by: NURSE PRACTITIONER

## 2021-05-01 PROCEDURE — 96374 THER/PROPH/DIAG INJ IV PUSH: CPT

## 2021-05-01 RX ORDER — DIPHENHYDRAMINE HYDROCHLORIDE 50 MG/ML
25 INJECTION INTRAMUSCULAR; INTRAVENOUS ONCE
Status: COMPLETED | OUTPATIENT
Start: 2021-05-01 | End: 2021-05-01

## 2021-05-01 RX ORDER — ONDANSETRON 4 MG/1
4 TABLET, ORALLY DISINTEGRATING ORAL EVERY 8 HOURS PRN
Qty: 10 TABLET | Refills: 0 | Status: SHIPPED | OUTPATIENT
Start: 2021-05-01 | End: 2022-05-01

## 2021-05-01 RX ORDER — 0.9 % SODIUM CHLORIDE 0.9 %
1000 INTRAVENOUS SOLUTION INTRAVENOUS ONCE
Status: COMPLETED | OUTPATIENT
Start: 2021-05-01 | End: 2021-05-01

## 2021-05-01 RX ORDER — PROCHLORPERAZINE EDISYLATE 5 MG/ML
10 INJECTION INTRAMUSCULAR; INTRAVENOUS ONCE
Status: COMPLETED | OUTPATIENT
Start: 2021-05-01 | End: 2021-05-01

## 2021-05-01 RX ORDER — KETOROLAC TROMETHAMINE 30 MG/ML
15 INJECTION, SOLUTION INTRAMUSCULAR; INTRAVENOUS ONCE
Status: COMPLETED | OUTPATIENT
Start: 2021-05-01 | End: 2021-05-01

## 2021-05-01 RX ADMIN — PROCHLORPERAZINE EDISYLATE 10 MG: 5 INJECTION INTRAMUSCULAR; INTRAVENOUS at 21:09

## 2021-05-01 RX ADMIN — DIPHENHYDRAMINE HYDROCHLORIDE 25 MG: 50 INJECTION, SOLUTION INTRAMUSCULAR; INTRAVENOUS at 21:10

## 2021-05-01 RX ADMIN — SODIUM CHLORIDE 1000 ML: 9 INJECTION, SOLUTION INTRAVENOUS at 21:10

## 2021-05-01 RX ADMIN — KETOROLAC TROMETHAMINE 15 MG: 30 INJECTION, SOLUTION INTRAMUSCULAR; INTRAVENOUS at 21:10

## 2021-05-01 ASSESSMENT — PAIN DESCRIPTION - ONSET: ONSET: ON-GOING

## 2021-05-01 ASSESSMENT — PAIN DESCRIPTION - PAIN TYPE: TYPE: ACUTE PAIN

## 2021-05-01 ASSESSMENT — PAIN SCALES - GENERAL
PAINLEVEL_OUTOF10: 7
PAINLEVEL_OUTOF10: 7

## 2021-05-01 ASSESSMENT — PAIN DESCRIPTION - DESCRIPTORS: DESCRIPTORS: ACHING;CONSTANT;DISCOMFORT

## 2021-05-01 ASSESSMENT — PAIN DESCRIPTION - FREQUENCY: FREQUENCY: CONTINUOUS

## 2021-05-02 NOTE — ED NOTES
Discharge instructions and meds reviewed, no concerns. Ofelia Chavez CNP okay to d/c at this time.      Wendelin Goodell, RN  05/01/21 3193

## 2021-05-02 NOTE — ED PROVIDER NOTES
allergies    Physical Exam   Oxygen Saturation Interpretation: Normal.        ED Triage Vitals [05/01/21 2049]   BP Temp Temp Source Pulse Resp SpO2 Height Weight   139/83 98.5 °F (36.9 °C) Oral 82 16 100 % 5' 6\" (1.676 m) 220 lb (99.8 kg)         Constitutional:  Alert, development consistent with age. HEENT:  NC/NT. PERRLA. Airway patent. Neck:  Normal ROM. Supple. No meningeal signs  Respiratory:  Clear to auscultation and breath sounds equal.  CV:  Regular rate and rhythm, normal heart sounds, without pathological murmurs, ectopy, gallops, or rubs. GI:  Abdomen Soft, nontender, good bowel sounds. No firm or pulsatile mass. Back:  No costovertebral tenderness. Extremities: No tenderness or edema noted. Integument:  Normal turgor. Warm, dry, without visible rash, unless noted elsewhere. Lymphatics: No lymphangitis or adenopathy noted. Neurological:  Oriented x 3, GCS 15. CNII-XII grossly intact. Motor functions intact. Lab / Imaging Results   (All laboratory and radiology results have been personally reviewed by myself)  Labs:  No results found for this visit on 05/01/21. Imaging: All Radiology results interpreted by Radiologist unless otherwise noted. No orders to display     ED Course / Medical Decision Making     Medications   0.9 % sodium chloride bolus (1,000 mLs Intravenous New Bag 5/1/21 2110)   prochlorperazine (COMPAZINE) injection 10 mg (10 mg Intravenous Given 5/1/21 2109)   diphenhydrAMINE (BENADRYL) injection 25 mg (25 mg Intravenous Given 5/1/21 2110)   ketorolac (TORADOL) injection 15 mg (15 mg Intravenous Given 5/1/21 2110)        Re-examination:  5/1/21       Time: 2155  Patients condition is improving after treatment. Consult(s):   None    Procedure(s):   none    MDM:   Patient presented with acute on chronic right-sided headache. Clinical exam is benign. She had no focal neurologic deficits. She did not thunderclap headache.   Symptoms greatly improved with treatment emergency department. She is appropriate for discharge and outpatient follow-up. She is instructed to return to the emergency department with any new or worsening symptoms. Plan of Care/Counseling:  I reviewed today's visit with the patient in addition to providing specific details for the plan of care and counseling regarding the diagnosis and prognosis. Questions are answered at this time and are agreeable with the plan. Assessment      1. Non-intractable vomiting with nausea, unspecified vomiting type    2. Nonintractable headache, unspecified chronicity pattern, unspecified headache type      Plan   Discharge home. Patient condition is good    New Medications     New Prescriptions    ONDANSETRON (ZOFRAN ODT) 4 MG DISINTEGRATING TABLET    Take 1 tablet by mouth every 8 hours as needed for Nausea or Vomiting     Electronically signed by ROLAND hJa CNP   DD: 5/1/21  **This report was transcribed using voice recognition software. Every effort was made to ensure accuracy; however, inadvertent computerized transcription errors may be present.   END OF ED PROVIDER NOTE     ROLAND Elise CNP  05/01/21 0592

## 2021-10-29 ENCOUNTER — HOSPITAL ENCOUNTER (OUTPATIENT)
Age: 26
Discharge: HOME | End: 2021-10-31

## 2021-10-29 PROCEDURE — 88307 TISSUE EXAM BY PATHOLOGIST: CPT

## 2021-10-29 PROCEDURE — 88305 TISSUE EXAM BY PATHOLOGIST: CPT

## 2021-11-01 ENCOUNTER — APPOINTMENT (OUTPATIENT)
Dept: ULTRASOUND IMAGING | Age: 26
End: 2021-11-01
Payer: MEDICAID

## 2021-11-01 ENCOUNTER — APPOINTMENT (OUTPATIENT)
Dept: GENERAL RADIOLOGY | Age: 26
End: 2021-11-01
Payer: MEDICAID

## 2021-11-01 ENCOUNTER — HOSPITAL ENCOUNTER (EMERGENCY)
Age: 26
Discharge: LEFT BEFORE DISCHARGE | End: 2021-11-01
Attending: EMERGENCY MEDICINE
Payer: MEDICAID

## 2021-11-01 VITALS
SYSTOLIC BLOOD PRESSURE: 132 MMHG | HEIGHT: 67 IN | WEIGHT: 228 LBS | HEART RATE: 74 BPM | TEMPERATURE: 97 F | BODY MASS INDEX: 35.79 KG/M2 | OXYGEN SATURATION: 98 % | RESPIRATION RATE: 14 BRPM | DIASTOLIC BLOOD PRESSURE: 74 MMHG

## 2021-11-01 DIAGNOSIS — M79.604 BILATERAL LEG PAIN: Primary | ICD-10-CM

## 2021-11-01 DIAGNOSIS — M79.605 BILATERAL LEG PAIN: Primary | ICD-10-CM

## 2021-11-01 LAB
ALBUMIN SERPL-MCNC: 4.3 G/DL (ref 3.5–5.2)
ALP BLD-CCNC: 61 U/L (ref 35–104)
ALT SERPL-CCNC: 17 U/L (ref 0–32)
ANION GAP SERPL CALCULATED.3IONS-SCNC: 11 MMOL/L (ref 7–16)
AST SERPL-CCNC: 16 U/L (ref 0–31)
BASOPHILS ABSOLUTE: 0.05 E9/L (ref 0–0.2)
BASOPHILS RELATIVE PERCENT: 0.7 % (ref 0–2)
BILIRUB SERPL-MCNC: 0.3 MG/DL (ref 0–1.2)
BUN BLDV-MCNC: 10 MG/DL (ref 6–20)
CALCIUM SERPL-MCNC: 9.3 MG/DL (ref 8.6–10.2)
CHLORIDE BLD-SCNC: 105 MMOL/L (ref 98–107)
CO2: 23 MMOL/L (ref 22–29)
CREAT SERPL-MCNC: 0.7 MG/DL (ref 0.5–1)
EOSINOPHILS ABSOLUTE: 0.12 E9/L (ref 0.05–0.5)
EOSINOPHILS RELATIVE PERCENT: 1.7 % (ref 0–6)
GFR AFRICAN AMERICAN: >60
GFR NON-AFRICAN AMERICAN: >60 ML/MIN/1.73
GLUCOSE BLD-MCNC: 81 MG/DL (ref 74–99)
HCG QUALITATIVE: NEGATIVE
HCT VFR BLD CALC: 37.4 % (ref 34–48)
HEMOGLOBIN: 11.8 G/DL (ref 11.5–15.5)
IMMATURE GRANULOCYTES #: 0.03 E9/L
IMMATURE GRANULOCYTES %: 0.4 % (ref 0–5)
LYMPHOCYTES ABSOLUTE: 2.3 E9/L (ref 1.5–4)
LYMPHOCYTES RELATIVE PERCENT: 31.9 % (ref 20–42)
MCH RBC QN AUTO: 28.8 PG (ref 26–35)
MCHC RBC AUTO-ENTMCNC: 31.6 % (ref 32–34.5)
MCV RBC AUTO: 91.2 FL (ref 80–99.9)
MONOCYTES ABSOLUTE: 0.5 E9/L (ref 0.1–0.95)
MONOCYTES RELATIVE PERCENT: 6.9 % (ref 2–12)
NEUTROPHILS ABSOLUTE: 4.21 E9/L (ref 1.8–7.3)
NEUTROPHILS RELATIVE PERCENT: 58.4 % (ref 43–80)
PDW BLD-RTO: 13.1 FL (ref 11.5–15)
PLATELET # BLD: 181 E9/L (ref 130–450)
PMV BLD AUTO: 11.2 FL (ref 7–12)
POTASSIUM REFLEX MAGNESIUM: 4.5 MMOL/L (ref 3.5–5)
RBC # BLD: 4.1 E12/L (ref 3.5–5.5)
SODIUM BLD-SCNC: 139 MMOL/L (ref 132–146)
TOTAL PROTEIN: 6.9 G/DL (ref 6.4–8.3)
WBC # BLD: 7.2 E9/L (ref 4.5–11.5)

## 2021-11-01 PROCEDURE — 36415 COLL VENOUS BLD VENIPUNCTURE: CPT

## 2021-11-01 PROCEDURE — 99283 EMERGENCY DEPT VISIT LOW MDM: CPT

## 2021-11-01 PROCEDURE — 93970 EXTREMITY STUDY: CPT

## 2021-11-01 PROCEDURE — 85025 COMPLETE CBC W/AUTO DIFF WBC: CPT

## 2021-11-01 PROCEDURE — 84703 CHORIONIC GONADOTROPIN ASSAY: CPT

## 2021-11-01 PROCEDURE — 72100 X-RAY EXAM L-S SPINE 2/3 VWS: CPT

## 2021-11-01 PROCEDURE — 80053 COMPREHEN METABOLIC PANEL: CPT

## 2021-11-01 RX ORDER — ORPHENADRINE CITRATE 30 MG/ML
60 INJECTION INTRAMUSCULAR; INTRAVENOUS ONCE
Status: DISCONTINUED | OUTPATIENT
Start: 2021-11-01 | End: 2021-11-01 | Stop reason: HOSPADM

## 2021-11-01 RX ORDER — KETOROLAC TROMETHAMINE 30 MG/ML
30 INJECTION, SOLUTION INTRAMUSCULAR; INTRAVENOUS ONCE
Status: DISCONTINUED | OUTPATIENT
Start: 2021-11-01 | End: 2021-11-01 | Stop reason: HOSPADM

## 2021-11-01 ASSESSMENT — PAIN DESCRIPTION - PAIN TYPE: TYPE: ACUTE PAIN

## 2021-11-01 ASSESSMENT — PAIN DESCRIPTION - DESCRIPTORS: DESCRIPTORS: SHARP

## 2021-11-01 ASSESSMENT — PAIN DESCRIPTION - ORIENTATION: ORIENTATION: RIGHT;LEFT;LOWER

## 2021-11-01 ASSESSMENT — PAIN DESCRIPTION - LOCATION: LOCATION: LEG

## 2021-11-01 ASSESSMENT — PAIN DESCRIPTION - FREQUENCY: FREQUENCY: INTERMITTENT

## 2021-11-01 NOTE — ED PROVIDER NOTES
Department of Emergency Medicine   ED  Provider Note  Admit Date/RoomTime: 11/1/2021  1:40 PM  ED Room: 18/18          History of Present Illness:  11/1/21, Time: 2:54 PM EDT  Chief Complaint   Patient presents with    Leg Pain     bilateral leg pain and numbness from knees down since yesterday. Gray Rodrigues is a 27 y.o. female presenting to the ED for bilateral leg pain and numbness, beginning yesterday. The complaint has been persistent, mild in severity, and worsened by nothing. Patient states 3 days ago she had a cyst removed from her breast, stating the surgery took approximately 45 minutes. She states that yesterday she began to have some numbness and cramping pain to her left leg that radiated down to the lower leg. Today she noted that the numbness and tingling was also in the right leg. She denies any difficulty ambulating. She denies any lower back pain, no dysuria. There has been no recent fevers. She denies any recent fall or trauma. Review of Systems:   Pertinent positives and negatives are stated within HPI, all other systems reviewed and are negative.        --------------------------------------------- PAST HISTORY ---------------------------------------------  Past Medical History:  has a past medical history of Asthma, Brachial neuritis or radiculitis NOS, Headache(784.0), Migraine, and Migraine with aura, without mention of intractable migraine without mention of status migrainosus. Past Surgical History:  has no past surgical history on file. Social History:  reports that she has been smoking cigarettes. She has been smoking about 0.50 packs per day. She has never used smokeless tobacco. She reports that she does not drink alcohol and does not use drugs. Family History: family history includes Arthritis in her mother; Asthma in her mother; Breast Cancer in her paternal grandmother; High Blood Pressure in her mother. . Unless otherwise noted, family history is non contributory    The patients home medications have been reviewed. Allergies: No known allergies        ---------------------------------------------------PHYSICAL EXAM--------------------------------------    Constitutional/General: Alert and oriented x3  Head: Normocephalic and atraumatic  Eyes: PERRL, EOMI, sclera non icteric  Mouth: Oropharynx clear, handling secretions, no trismus, no asymmetry of the posterior oropharynx or uvular edema  Neck: Supple, full ROM, no stridor, no meningeal signs  Respiratory: Lungs clear to auscultation bilaterally, no wheezes, rales, or rhonchi. Not in respiratory distress  Cardiovascular:  Regular rate. Regular rhythm. No murmurs, no aortic murmurs, no gallops, or rubs. 2+ distal pulses. Equal extremity pulses. Chest: No chest wall tenderness  GI:  Abdomen Soft, Non tender, Non distended. No rebound, guarding, or rigidity. No pulsatile masses. Musculoskeletal: Moves all extremities x 4. Warm and well perfused, no clubbing, cyanosis, or edema. Capillary refill <3 seconds  Integument: skin warm and dry. No rashes. Neurologic: GCS 15, no focal deficits, symmetric strength 5/5 in the upper and lower extremities bilaterally  Psychiatric: Normal Affect          -------------------------------------------------- RESULTS -------------------------------------------------  I have personally reviewed all laboratory and imaging results for this patient. Results are listed below.      LABS: (Lab results interpreted by me)  Results for orders placed or performed during the hospital encounter of 11/01/21   CBC Auto Differential   Result Value Ref Range    WBC 7.2 4.5 - 11.5 E9/L    RBC 4.10 3.50 - 5.50 E12/L    Hemoglobin 11.8 11.5 - 15.5 g/dL    Hematocrit 37.4 34.0 - 48.0 %    MCV 91.2 80.0 - 99.9 fL    MCH 28.8 26.0 - 35.0 pg    MCHC 31.6 (L) 32.0 - 34.5 %    RDW 13.1 11.5 - 15.0 fL    Platelets 549 153 - 115 E9/L    MPV 11.2 7.0 - 12.0 fL    Neutrophils % 58.4 43.0 - 80.0 % Immature Granulocytes % 0.4 0.0 - 5.0 %    Lymphocytes % 31.9 20.0 - 42.0 %    Monocytes % 6.9 2.0 - 12.0 %    Eosinophils % 1.7 0.0 - 6.0 %    Basophils % 0.7 0.0 - 2.0 %    Neutrophils Absolute 4.21 1.80 - 7.30 E9/L    Immature Granulocytes # 0.03 E9/L    Lymphocytes Absolute 2.30 1.50 - 4.00 E9/L    Monocytes Absolute 0.50 0.10 - 0.95 E9/L    Eosinophils Absolute 0.12 0.05 - 0.50 E9/L    Basophils Absolute 0.05 0.00 - 0.20 E9/L   Comprehensive Metabolic Panel w/ Reflex to MG   Result Value Ref Range    Sodium 139 132 - 146 mmol/L    Potassium reflex Magnesium 4.5 3.5 - 5.0 mmol/L    Chloride 105 98 - 107 mmol/L    CO2 23 22 - 29 mmol/L    Anion Gap 11 7 - 16 mmol/L    Glucose 81 74 - 99 mg/dL    BUN 10 6 - 20 mg/dL    CREATININE 0.7 0.5 - 1.0 mg/dL    GFR Non-African American >60 >=60 mL/min/1.73    GFR African American >60     Calcium 9.3 8.6 - 10.2 mg/dL    Total Protein 6.9 6.4 - 8.3 g/dL    Albumin 4.3 3.5 - 5.2 g/dL    Total Bilirubin 0.3 0.0 - 1.2 mg/dL    Alkaline Phosphatase 61 35 - 104 U/L    ALT 17 0 - 32 U/L    AST 16 0 - 31 U/L   HCG, SERUM, QUALITATIVE   Result Value Ref Range    hCG Qual NEGATIVE NEGATIVE   ,       RADIOLOGY:  Interpreted by Radiologist unless otherwise specified  XR LUMBAR SPINE (2-3 VIEWS)   Final Result   Unremarkable examination of the lumbar spine. US DUP LOWER EXTREMITIES BILATERAL VENOUS   Final Result   No evidence of DVT in either lower extremity.                   ------------------------- NURSING NOTES AND VITALS REVIEWED ---------------------------   The nursing notes within the ED encounter and vital signs as below have been reviewed by myself  /74   Pulse 74   Temp 97 °F (36.1 °C) (Temporal)   Resp 14   Ht 5' 7\" (1.702 m)   Wt 228 lb (103.4 kg)   SpO2 98%   BMI 35.71 kg/m²     Oxygen Saturation Interpretation: Normal    The patients available past medical records and past encounters were reviewed.         ------------------------------ ED

## 2021-11-01 NOTE — ED NOTES
FIRST PROVIDER CONTACT ASSESSMENT NOTE      Department of Emergency Medicine   11/1/21  1:18 PM EDT    Chief Complaint: Leg Pain (bilateral leg pain and numbness from knees down since yesterday. )      History of Present Illness:   Franklin Roman is a 27 y.o. female who presents to the ED for bilateral leg pain and numbness since last night at 10 PM.  States it comes and goes. States it starts at the knees and goes down to her feet. No history of prior. States that she had surgery yesterday to remove a mass from her breast.  Denies any manipulation to the back without surgery. States that they did not put anything in her back. She is not having any back pain or abdominal pain or chest pain or shortness of breath. No fevers or chills or nausea or vomiting. No loss of bowel or bladder function. No saddle paresthesias. No IV drug use. No recent spinal surgery. No recent chiropractic manipulation. States that she is not having the numbness and tingling at this time. Does have bilateral calf pain. Medical History:  has a past medical history of Asthma, Brachial neuritis or radiculitis NOS, Headache(784.0), Migraine, and Migraine with aura, without mention of intractable migraine without mention of status migrainosus. Surgical History:  has no past surgical history on file. Social History:  reports that she has been smoking cigarettes. She has been smoking about 0.50 packs per day. She has never used smokeless tobacco. She reports that she does not drink alcohol and does not use drugs. Family History: family history includes Arthritis in her mother; Asthma in her mother; Breast Cancer in her paternal grandmother; High Blood Pressure in her mother.     *ALLERGIES*     No known allergies     Physical Exam:      VS:  /74   Pulse 74   Temp 97 °F (36.1 °C) (Temporal)   Resp 14   Ht 5' 7\" (1.702 m)   Wt 228 lb (103.4 kg)   SpO2 98%   BMI 35.71 kg/m²      Initial Plan of Care:  Initiate Treatment-Testing, Proceed toTreatment Area When Bed Available for ED Attending/MLP to Continue Care    Strength 5/5 to BLE  Sensation intact  ROM intact to BLE  DP pulses 2+ bilaterally  Cap refill < 3 seconds bilaterally  No back pain  Calf pain TTP bilaterally    No room at this time. Patient told to let us know for any new or worsening symptoms while she sits in the waiting room.       -----------------END OF FIRST PROVIDER CONTACT ASSESSMENT NOTE--------------  Electronically signed by Erika Torres PA-C   DD: 11/1/21             Margarita Aguirre PA-C  11/01/21 3605

## 2021-11-01 NOTE — ED NOTES
Patient eloped before completion of care and unable to be found. Dr Michelle Taveras updated.      Vito Chopra, RN  11/01/21 2329